# Patient Record
Sex: FEMALE | Race: WHITE | NOT HISPANIC OR LATINO | Employment: OTHER | URBAN - METROPOLITAN AREA
[De-identification: names, ages, dates, MRNs, and addresses within clinical notes are randomized per-mention and may not be internally consistent; named-entity substitution may affect disease eponyms.]

---

## 2023-07-17 ENCOUNTER — HOSPITAL ENCOUNTER (OUTPATIENT)
Dept: DATA CONVERSION | Facility: HOSPITAL | Age: 73
End: 2023-07-18
Attending: NEUROLOGICAL SURGERY | Admitting: NEUROLOGICAL SURGERY
Payer: MEDICARE

## 2023-07-17 DIAGNOSIS — M48.062 SPINAL STENOSIS, LUMBAR REGION WITH NEUROGENIC CLAUDICATION: ICD-10-CM

## 2023-07-17 LAB
POCT GLUCOSE: 127 MG/DL (ref 74–99)
POCT GLUCOSE: 146 MG/DL (ref 74–99)
POCT GLUCOSE: 147 MG/DL (ref 74–99)
POCT GLUCOSE: 150 MG/DL (ref 74–99)
POCT GLUCOSE: 152 MG/DL (ref 74–99)

## 2023-07-18 LAB
ANION GAP IN SER/PLAS: 11 MMOL/L (ref 10–20)
CALCIUM (MG/DL) IN SER/PLAS: 8.6 MG/DL (ref 8.6–10.3)
CARBON DIOXIDE, TOTAL (MMOL/L) IN SER/PLAS: 26 MMOL/L (ref 21–32)
CHLORIDE (MMOL/L) IN SER/PLAS: 106 MMOL/L (ref 98–107)
CREATININE (MG/DL) IN SER/PLAS: 1.29 MG/DL (ref 0.5–1.05)
ERYTHROCYTE DISTRIBUTION WIDTH (RATIO) BY AUTOMATED COUNT: 13.2 % (ref 11.5–14.5)
ERYTHROCYTE DISTRIBUTION WIDTH (RATIO) BY AUTOMATED COUNT: NORMAL
ERYTHROCYTE MEAN CORPUSCULAR HEMOGLOBIN CONCENTRATION (G/DL) BY AUTOMATED: 30.4 G/DL (ref 32–36)
ERYTHROCYTE MEAN CORPUSCULAR HEMOGLOBIN CONCENTRATION (G/DL) BY AUTOMATED: NORMAL
ERYTHROCYTE MEAN CORPUSCULAR VOLUME (FL) BY AUTOMATED COUNT: 94 FL (ref 80–100)
ERYTHROCYTE MEAN CORPUSCULAR VOLUME (FL) BY AUTOMATED COUNT: NORMAL
ERYTHROCYTES (10*6/UL) IN BLOOD BY AUTOMATED COUNT: 3.73 X10E12/L (ref 4–5.2)
ERYTHROCYTES (10*6/UL) IN BLOOD BY AUTOMATED COUNT: NORMAL
GFR FEMALE: 43 ML/MIN/1.73M2
GLUCOSE (MG/DL) IN SER/PLAS: 112 MG/DL (ref 74–99)
HEMATOCRIT (%) IN BLOOD BY AUTOMATED COUNT: 35.2 % (ref 36–46)
HEMATOCRIT (%) IN BLOOD BY AUTOMATED COUNT: NORMAL
HEMOGLOBIN (G/DL) IN BLOOD: 10.7 G/DL (ref 12–16)
HEMOGLOBIN (G/DL) IN BLOOD: NORMAL
LEUKOCYTES (10*3/UL) IN BLOOD BY AUTOMATED COUNT: 10.9 X10E9/L (ref 4.4–11.3)
LEUKOCYTES (10*3/UL) IN BLOOD BY AUTOMATED COUNT: NORMAL
NRBC (PER 100 WBCS) BY AUTOMATED COUNT: 0 /100 WBC (ref 0–0)
NRBC (PER 100 WBCS) BY AUTOMATED COUNT: NORMAL
PLATELETS (10*3/UL) IN BLOOD AUTOMATED COUNT: 220 X10E9/L (ref 150–450)
PLATELETS (10*3/UL) IN BLOOD AUTOMATED COUNT: NORMAL
POCT GLUCOSE: 106 MG/DL (ref 74–99)
POCT GLUCOSE: 132 MG/DL (ref 74–99)
POTASSIUM (MMOL/L) IN SER/PLAS: 4.2 MMOL/L (ref 3.5–5.3)
SODIUM (MMOL/L) IN SER/PLAS: 139 MMOL/L (ref 136–145)
UREA NITROGEN (MG/DL) IN SER/PLAS: 19 MG/DL (ref 6–23)

## 2023-07-19 LAB
ANION GAP IN SER/PLAS: NORMAL
CALCIUM (MG/DL) IN SER/PLAS: NORMAL
CARBON DIOXIDE, TOTAL (MMOL/L) IN SER/PLAS: NORMAL
CHLORIDE (MMOL/L) IN SER/PLAS: NORMAL
CREATININE (MG/DL) IN SER/PLAS: NORMAL
ERYTHROCYTE DISTRIBUTION WIDTH (RATIO) BY AUTOMATED COUNT: NORMAL
ERYTHROCYTE MEAN CORPUSCULAR HEMOGLOBIN CONCENTRATION (G/DL) BY AUTOMATED: NORMAL
ERYTHROCYTE MEAN CORPUSCULAR VOLUME (FL) BY AUTOMATED COUNT: NORMAL
ERYTHROCYTES (10*6/UL) IN BLOOD BY AUTOMATED COUNT: NORMAL
GFR FEMALE: NORMAL
GFR MALE: NORMAL
GLUCOSE (MG/DL) IN SER/PLAS: NORMAL
HEMATOCRIT (%) IN BLOOD BY AUTOMATED COUNT: NORMAL
HEMOGLOBIN (G/DL) IN BLOOD: NORMAL
LEUKOCYTES (10*3/UL) IN BLOOD BY AUTOMATED COUNT: NORMAL
NRBC (PER 100 WBCS) BY AUTOMATED COUNT: NORMAL
PLATELETS (10*3/UL) IN BLOOD AUTOMATED COUNT: NORMAL
POTASSIUM (MMOL/L) IN SER/PLAS: NORMAL
SODIUM (MMOL/L) IN SER/PLAS: NORMAL
UREA NITROGEN (MG/DL) IN SER/PLAS: NORMAL

## 2023-09-20 PROBLEM — M25.78 DEGENERATION OF INTERVERTEBRAL DISC OF CERVICAL REGION WITH OSTEOPHYTE OF CERVICAL VERTEBRA: Status: ACTIVE | Noted: 2023-09-20

## 2023-09-20 PROBLEM — Z98.1 HISTORY OF LUMBAR SPINAL FUSION: Status: ACTIVE | Noted: 2023-09-20

## 2023-09-20 PROBLEM — L03.032 PARONYCHIA OF GREAT TOE OF LEFT FOOT: Status: ACTIVE | Noted: 2023-09-20

## 2023-09-20 PROBLEM — M48.062 NEUROGENIC CLAUDICATION DUE TO LUMBAR SPINAL STENOSIS: Status: ACTIVE | Noted: 2023-09-20

## 2023-09-20 PROBLEM — M43.10 SPONDYLOLISTHESIS, GRADE 1: Status: ACTIVE | Noted: 2023-09-20

## 2023-09-20 PROBLEM — K58.0 IRRITABLE BOWEL SYNDROME WITH DIARRHEA: Status: ACTIVE | Noted: 2023-09-20

## 2023-09-20 PROBLEM — L60.0 ONYCHOCRYPTOSIS: Status: ACTIVE | Noted: 2023-09-20

## 2023-09-20 PROBLEM — R26.89 IMBALANCE: Status: ACTIVE | Noted: 2023-09-20

## 2023-09-20 PROBLEM — G95.9 CERVICAL MYELOPATHY (MULTI): Status: ACTIVE | Noted: 2023-09-20

## 2023-09-20 PROBLEM — R20.0 NUMBNESS OF ANTERIOR THIGH: Status: ACTIVE | Noted: 2023-09-20

## 2023-09-20 PROBLEM — M50.30 DEGENERATION OF INTERVERTEBRAL DISC OF CERVICAL REGION WITH OSTEOPHYTE OF CERVICAL VERTEBRA: Status: ACTIVE | Noted: 2023-09-20

## 2023-09-20 PROBLEM — G56.00 CARPAL TUNNEL SYNDROME: Status: ACTIVE | Noted: 2023-09-20

## 2023-09-20 PROBLEM — B35.1 ONYCHOMYCOSIS: Status: ACTIVE | Noted: 2023-09-20

## 2023-09-20 PROBLEM — M47.22 CERVICAL SPONDYLOSIS WITH RADICULOPATHY: Status: ACTIVE | Noted: 2023-09-20

## 2023-09-20 PROBLEM — L89.892: Status: ACTIVE | Noted: 2023-09-20

## 2023-09-20 RX ORDER — METFORMIN HYDROCHLORIDE 1000 MG/1
TABLET ORAL
COMMUNITY
Start: 2023-07-16

## 2023-09-20 RX ORDER — DOXAZOSIN 4 MG/1
1 TABLET ORAL DAILY
COMMUNITY
Start: 2013-12-27

## 2023-09-20 RX ORDER — ACETAMINOPHEN 500 MG
1 TABLET ORAL NIGHTLY
COMMUNITY

## 2023-09-20 RX ORDER — CHOLESTYRAMINE 4 G/9G
2 POWDER, FOR SUSPENSION ORAL DAILY
COMMUNITY
Start: 2017-01-12

## 2023-09-20 RX ORDER — ATORVASTATIN CALCIUM 20 MG/1
1 TABLET, FILM COATED ORAL DAILY
COMMUNITY
Start: 2014-01-02

## 2023-09-20 RX ORDER — TRIAMTERENE/HYDROCHLOROTHIAZID 37.5-25 MG
1 TABLET ORAL DAILY
COMMUNITY
Start: 2014-09-24

## 2023-09-20 RX ORDER — MULTIVIT-MIN/FA/LYCOPEN/LUTEIN .4-300-25
1 TABLET ORAL DAILY
COMMUNITY

## 2023-09-20 RX ORDER — CICLOPIROX 80 MG/ML
SOLUTION TOPICAL
COMMUNITY
Start: 2019-04-18

## 2023-09-20 RX ORDER — LORAZEPAM 0.5 MG/1
2 TABLET ORAL 2 TIMES DAILY PRN
COMMUNITY

## 2023-09-20 RX ORDER — CLOBETASOL PROPIONATE 0.5 MG/G
CREAM TOPICAL
COMMUNITY
Start: 2014-02-03

## 2023-09-20 RX ORDER — GENTAMICIN SULFATE 1 MG/G
CREAM TOPICAL
COMMUNITY
Start: 2019-12-02

## 2023-09-20 RX ORDER — METFORMIN HYDROCHLORIDE 500 MG/1
1 TABLET ORAL
COMMUNITY
Start: 2014-09-24

## 2023-09-20 RX ORDER — METOPROLOL SUCCINATE 50 MG/1
1 TABLET, EXTENDED RELEASE ORAL DAILY
COMMUNITY
Start: 2023-07-16

## 2023-09-20 RX ORDER — DIAZEPAM 5 MG/1
TABLET ORAL
COMMUNITY
Start: 2023-04-03

## 2023-09-20 RX ORDER — SERTRALINE HYDROCHLORIDE 25 MG/1
25 TABLET, FILM COATED ORAL DAILY
COMMUNITY

## 2023-09-20 RX ORDER — CYCLOBENZAPRINE HCL 5 MG
1-2 TABLET ORAL 3 TIMES DAILY PRN
COMMUNITY

## 2023-09-20 RX ORDER — SERTRALINE HYDROCHLORIDE 50 MG/1
50 TABLET, FILM COATED ORAL DAILY
COMMUNITY
Start: 2023-07-16

## 2023-09-20 RX ORDER — NEBIVOLOL 20 MG/1
TABLET ORAL
COMMUNITY
Start: 2013-12-31

## 2023-09-29 VITALS — BODY MASS INDEX: 34.96 KG/M2 | WEIGHT: 185.19 LBS | HEIGHT: 61 IN

## 2023-09-30 NOTE — PROGRESS NOTES
Service: General Internal Medicine     Subjective Data:   SIENNA MALCOLM is a 73 year old Female who is Hospital Day # 2 and POD #1 for 1. L2-3 L3-4 DECOMPRESSIVE LAMINECTOMY WITH FLAT PLATE LUMBAR X-RAY;    Additional Information:    A complete 10 point review of systems was completed and is otherwise negative unless stated.     Objective Data:     Objective Information:      T   P  R  BP   MAP  SpO2   Value  36.8  75  18  122/59   84  95%  Date/Time 7/18 7:27 7/18 7:27 7/18 7:27 7/18 7:27 7/18 7:27 7/18 7:27  Range  (36.2C - 37.2C )  (63 - 75 )  (16 - 18 )  (112 - 131 )/ (56 - 73 )  (79 - 92 )  (93% - 98% )   As of 17-Jul-2023 12:37:00, patient is on 2 L/min of oxygen via room air.  Highest temp of 37.2 C was recorded at 7/17 23:22      Pain reported at 7/18 8:00: 3 = Mild    Physical Exam Narrative:  ·  Physical Exam:    Physical Exam:   General appearance: no distress, well-appearing   HEENT: Atraumatic/normocephalic, moist mucosa  Neck: no obvious deformity, JVP WNL  Respiratory: good air movement, appropriate respiratory effort, no wheezing or crackles  Cardiovascular: regular rate, regular rhythm, no peripheral edema  Abdomen: no organomegaly, no tenderness to palpation in all quadrants  Extremities: strong peripheral pulses, no grossly obvious deformities   Skin: intact, no rashes   Neurologic: Alert and oriented x 3, No obvious focal deficit  Psych: appropriate mood & affect, cooperative     Recent Lab Results:    Results:    CBC: 7/18/2023 05:48              \     Hgb     /                              \     10.7 L    /  WBC  ----------------  Plt               10.9       ----------------    220              /     Hct     \                              /     35.2 L    \            RBC: 3.73 L    MCV: 94           BMP: 7/18/2023 05:48  NA+        Cl-     BUN  /                         139    106    19  /  --------------------------------  Glucose                ---------------------------  112 H     K+     HCO3-   Creat \                         4.2  26    1.29 H \  Calcium : 8.6     Anion Gap : 11      Assessment and Plan:   Daily Risk Screen:  ·  Does patient have an indwelling urinary catheter? n/a consulting service     Code Status:  ·  Code Status Full Code     Assessment:    73-year-old here for elective lumbar laminectomy.    7/18: Pain is tolerable at present, worked with PT last night, no medical issues or lab derangements    #Postop day 1, lumbar laminectomy  #History of lumbar and cervical spinal stenosis  Management per neurosurgical team  Analgesia per neurosurgical team  Educated on postop alarm signs and when to contact medical team  Given family history, wonder if she has some HLA gene mutation    #Hypertension  #Type 2 diabetes  #Anxiety and depression  Continue home meds  Watch sugars, sliding scale if sugars become out of control    Diet:reg  Fluids:prn  DVT ppx: enox  Dispo:pending  Code Status: full    This is a preliminary note, please await attending attestation for final assessment and plan.    Mhaad Fleming, DO PGY-3. Please contact me on [facility] with any additional questions, comments, or concerns.    This note was entered with the assistance of voice recognition software. Please excuse any grammatical or syntactic errors, I attempted to correct them to the best of my ability.      Attestation:   Note Completion:  I am a:  Resident/Fellow   Attending Attestation I saw and evaluated the patient.  I personally obtained the key and critical portions of the history and physical exam or was physically present for key and  critical portions performed by the resident/fellow. I reviewed the resident/fellow?s documentation and discussed the patient with the resident/fellow.  I agree with the resident/fellow?s medical decision making as documented in the note.     I personally evaluated the patient on 18-Jul-2023   Comments/ Additional Findings    I interviewed and examined patient with  resident team.          Electronic Signatures:  Mahad Fleming (DO (Resident))  (Signed 18-Jul-2023 11:33)   Authored: Service, Subjective Data, Objective Data, Assessment  and Plan, Note Completion  Clarence Lynn)  (Signed 19-Jul-2023 19:08)   Authored: Note Completion   Co-Signer: Service, Subjective Data, Objective Data, Assessment and Plan, Note Completion      Last Updated: 19-Jul-2023 19:08 by Clarence Lynn)

## 2023-09-30 NOTE — PROGRESS NOTES
Service: Neurosurgery     Subjective Data:   SIENNA MALCOLM is a 73 year old Female who is Hospital Day # 2 and POD #1 for 1. L2-3 L3-4 DECOMPRESSIVE LAMINECTOMY WITH FLAT PLATE LUMBAR X-RAY;    Additional Information:    Patient seen and examined with Dr. Terry Moran:  Rodriguez removed this morning  She reports improvement in lower extremities post operatively  Lumbar incision is well-approximated, no swelling / erythema / drainage    POD#1 L2 - 4 decompressive laminectomy by Dr. Moran  - progressing well  - await PT / OT evaluation prior to discharge  - discharge home today  Abigail Barnes CNP    Objective Data:     Objective Information:      T   P  R  BP   MAP  SpO2   Value  36.8  75  16  122/59   84  93%  Date/Time 7/18 7:27 7/18 7:27 7/18 3:22 7/18 7:27  7/18 7:27 7/18 7:27  Range  (36.2C - 37.2C )  (63 - 75 )  (16 - 18 )  (112 - 131 )/ (56 - 73 )  (79 - 92 )  (93% - 98% )   As of 17-Jul-2023 12:37:00, patient is on 2 L/min of oxygen via room air.  Highest temp of 37.2 C was recorded at 7/17 23:22      Pain reported at 7/18 7:38: 4 = Moderate    Recent Lab Results:    Results:    CBC: 7/18/2023 05:48              \     Hgb     /                              \     10.7 L    /  WBC  ----------------  Plt               10.9       ----------------    220              /     Hct     \                              /     35.2 L    \            RBC: 3.73 L    MCV: 94           BMP: 7/18/2023 05:48  NA+        Cl-     BUN  /                         139    106    19  /  --------------------------------  Glucose                ---------------------------  112 H    K+     HCO3-   Creat \                         4.2  26    1.29 H \  Calcium : 8.6     Anion Gap : 11      Assessment and Plan:   Daily Risk Screen:  ·  Does patient have an indwelling urinary catheter? yes   ·  Plan for indwelling urinary catheter removal today? yes     Comorbidities:  ·  Comorbidity obesity   ·  Obesity obesity (BMI 35-39.9)   ·   BMI 35.56     Code Status:  ·  Code Status Full Code     Advance Care Planning:  Advance Care Planning: Patient didn't wish to or  was unable to provide advance care plan       Electronic Signatures:  Abigail Barnes (APRN-CNP)  (Signed 18-Jul-2023 07:58)   Authored: Service, Subjective Data, Objective Data, Assessment  and Plan, Note Completion      Last Updated: 18-Jul-2023 07:58 by Abigail Barnes (APRN-CNP)

## 2023-09-30 NOTE — H&P
History & Physical Reviewed:   I have reviewed the History and Physical dated:  30-Jun-2023   History and Physical reviewed and relevant findings noted. Patient examined to review pertinent physical  findings.: No significant changes  pt examined by Dr Moran   Home Medications Reviewed: no changes noted   Allergies Reviewed: no changes noted       ERAS (Enhanced Recovery After Surgery):  ·  ERAS Patient: yes   ·  CPM/PAT Utilization: yes   ·  Immunonutrition Recovery Drink Utilization: no   ·  Carbohydrate Supplement Drink Utilization: no     Consent:   COVID-19 Consent:  ·  COVID-19 Risk Consent Surgeon has reviewed key risks related to the risk of roshan COVID-19 and if they contract COVID-19 what the risks are.     Attestation:   Note Completion:  I am a:  Advanced Practice Provider   Attending Only - Shared Visit with Advanced Practice Provider This is a shared visit.  I have reviewed the Advanced Practice Provider?s encounter note, approve the Advanced Practice Provider?s documentation,  and provide the following additional information from my personal encounter.    Comments/ Additional Findings    I personally saw this patient and I discussed the findings and the plan with the advanced practitioner.  I have personally reviewed all the available  imaging studies.  I agree with the assessment and the plan as stated.    Terry Moran MD          Electronic Signatures:  Terry Moran)  (Signed 17-Jul-2023 10:21)   Authored: Note Completion   Co-Signer: History & Physical Reviewed, ERAS, Consent, Note Completion  Heidi Ruelas (APRN-CNS)  (Signed 17-Jul-2023 08:01)   Authored: History & Physical Reviewed, ERAS, Consent,  Note Completion      Last Updated: 17-Jul-2023 10:21 by Terry Moran)

## 2023-09-30 NOTE — DISCHARGE SUMMARY
Send Summary:   Discharge Summary Providers:  Provider Role Provider Name   · Attending Terry Moran       Note Recipients: Terry Moran MD Debs, Michael, MD       Discharge:    Summary:   Admission Date: .17-Jul-2023 06:18:00   Discharge Date: 18-Jul-2023   Attending Physician at Discharge: Abigail Barnes   Admission Reason: Neurogenic claudication, lumbar  radiculopathy   Final Discharge Diagnoses: s/p decompression of lumbar  spinal canal   Procedures: Date: 17-Jul-2023 10:21:00  Procedure Name: 1. L2-3 L3-4 DECOMPRESSIVE LAMINECTOMY WITH FLAT PLATE LUMBAR X-RAY   Condition at Discharge: Satisfactory   Disposition at Discharge: .Home   Vital Signs:        T   P  R  BP   MAP  SpO2   Value  37.3  77  18  103/61   68  93%  Date/Time 7/18 11:40 7/18 11:40 7/18 11:40 7/18 11:40  7/18 11:40 7/18 11:40  Range  (36.2C - 37.3C )  (63 - 77 )  (16 - 18 )  (103 - 131 )/ (56 - 73 )  (68 - 92 )  (93% - 98% )   As of 17-Jul-2023 12:37:00, patient is on 2 L/min of oxygen via room air.  Highest temp of 37.3 C was recorded at 7/18 11:40    Date:            Weight/Scale Type:  Height:   17-Jul-2023 15:01  84  kg         153.7  cm  Physical Exam:    A&O x 3, speech clear  HUDSON  Lumbar incision C/D/I  Tolerating oral nutrition well  Hospital Course:    Admitted for lumbar decompression  Underwent L2 - 4 decompression on 07/17/2023  Post op course uncomplicated  Worked with PT / OT who felt she was safe for home discharge      Discharge Information:    and Continuing Care:   Lab Results - Pending:    None  Radiology Results - Pending: None   Discharge Instructions:    Activity:           activity as tolerated.          May shower..  do NOT RUB/SCRUB incision. DAB/PAT dry          May not drive until follow-up visit.            No pushing, pulling, or lifting objects greater than 10 pounds.            Walk hourly while awake  Wear lumbar brace when out of bed    Nutrition/Diet:           resume normal diet           Encourage Fluids:   Maintain hydration    Wound Care:           Wound Site:   Lumbar spine          Wound Type:   surgical incision          Cover With:   no dressing, leave open to air          Instructions:   no lotions, creams, or tub soaks          Other Instructions:   DO NOT TOUCH INCISION    Discharge Medications: Home Medication   docusate sodium 100 mg oral capsule - 1 cap(s) orally 2 times a day  polyethylene glycol 3350 oral powder for reconstitution - 17 gram(s) orally 2 times a day  metoprolol 50 mg oral tablet - 1 tab oral once a day  doxazosin 4 mg oral tablet - 1 tab oral once a day  atorvastatin 20 mg oral tablet - 1 tab oral once a day  Triamterene - 1 tab oral once a day  sertraline 50 mg oral tablet - 1 tab oral once a day  metFORMIN 500 mg oral tablet - 1 tab oral twice a day  cholestyramine 4 g/9 g oral powder for reconstitution - 1 tab oral once a day  acetaminophen 325 mg oral tablet - 3 tab(s) orally every 8 hours     PRN Medication   oxyCODONE 5 mg oral tablet - 1 tab(s) orally every 4 hours, As needed, Pain - Mod (4-6)     DNR Status:   ·  Code Status Code Status order at time of discharge: Full Code       Electronic Signatures:  Abigail Barnes ([facility]-CNP)  (Signed 18-Jul-2023 14:47)   Authored: Send Summary, Summary Content, Ongoing Care,  DNR Status, Note Completion      Last Updated: 18-Jul-2023 14:47 by Abigail Barnes (APRN-CNP)

## 2023-10-02 NOTE — OP NOTE
PROCEDURE DETAILS    Preoperative Diagnosis:  Neurogenic claudication due to lumbar spinal stenosis, M48.062    Postoperative Diagnosis:  Neurogenic claudication due to lumbar spinal stenosis, M48.062    Surgeon: Terry Moran  Resident/Fellow/Other Assistant: Darcie Rodriguez    Procedure:  1. L2-3 L3-4 DECOMPRESSIVE LAMINECTOMY WITH FLAT PLATE LUMBAR X-RAY    Anesthesia: No anesthesiologist associated with this case  Estimated Blood Loss: 20cc  Findings: Severe lateral recess stenosis bilaterally at L3-4 and L2-3.  Minimal blood loss and no durotomies.  Patient was stable throughout.  Specimens(s) Collected: no,     Complications: None  Drains and/or Catheters: Rodriguez catheter  Additional Details: The patient was brought into the operating room.  A timeout huddle was performed.  Baseline evoked potential monitoring was begun.  General endotracheal tube anesthesia  was administered and a glide scope intubation was performed.  Once we had baselines then the patient was positioned prone on the Ramon frame.  Her back was prepped and draped in usual sterile fashion.  The midline incision was drawn out.  An x-ray was  taken to verify proper location for our skin incision.  Then the skin was incised with a #10 blade.  Hemostasis was obtained using the Bovie electrocautery.  The Bovie was used to incise down through the subcutaneous fat down to the fascia.  The fascia  was incised off the spinous process of L2-L3 and L4.  Another confirmatory x-ray was taken.  The spinous process of L2 and L3 were removed as was a very small portion of the top of L4.  Then the lamina were thinned with a Leksell rongeur.  Then I was  able to use a Kerrison punch to remove much of the lamina I did use the chisel to undercut the inferior articulating process of L3 but I did not requiring that at L2.  I also chiseled off the top ring of L4 and then I was able to get underneath the kissing  facets at the L3-4 level.  This was the  tightest of the 2 levels.  There was also a small synovial cyst on the patient's left side.  I did fulgurate the surface of the facet joint to help avoid future synovial cyst.  Then I moved up 1 side and down the  other with the Kerrison punches and use the foraminotomies opening curved Kerrisons as well.  Once I was satisfied that the canal was decompressed and all of the foramina were widely open I irrigated with copious amounts of irrigation.  I took 1 final  confirmatory x-ray.  We had released the retractor system and there was no significant bleeding so I did not leave a drain.  The muscle was approximated with a couple of interrupted 0 Vicryl sutures.  The fascia was closed in a watertight manner with  interrupted 0 Vicryl sutures then I used a strata fix running stitch of 0 PDS to make certain that we had a watertight fascial closing.  Then we used inverted interrupted 2-0 Vicryl in the subcutaneous tissue.  We used 4-0 Vicryl on the subcu reticular  running stitch and reinforced the skin with glue.  The patient was stable throughout.  Terry Moran MD                                Attestation:   Note Completion:  Attending Attestation I performed the procedure without a resident         Electronic Signatures:  Terry Moran)  (Signed 17-Jul-2023 10:28)   Authored: Post-Operative Note, Chart Review, Note Completion      Last Updated: 17-Jul-2023 10:28 by Terry Moran)

## 2023-10-03 ENCOUNTER — TREATMENT (OUTPATIENT)
Dept: PHYSICAL THERAPY | Facility: CLINIC | Age: 73
End: 2023-10-03
Payer: MEDICARE

## 2023-10-03 ENCOUNTER — ANCILLARY PROCEDURE (OUTPATIENT)
Dept: RADIOLOGY | Facility: HOSPITAL | Age: 73
End: 2023-10-03
Payer: MEDICARE

## 2023-10-03 DIAGNOSIS — M48.062 SPINAL STENOSIS, LUMBAR REGION WITH NEUROGENIC CLAUDICATION: ICD-10-CM

## 2023-10-03 DIAGNOSIS — M48.062 NEUROGENIC CLAUDICATION DUE TO LUMBAR SPINAL STENOSIS: Primary | ICD-10-CM

## 2023-10-03 PROCEDURE — 97113 AQUATIC THERAPY/EXERCISES: CPT | Mod: GP,CQ

## 2023-10-03 PROCEDURE — 72110 X-RAY EXAM L-2 SPINE 4/>VWS: CPT | Performed by: RADIOLOGY

## 2023-10-03 PROCEDURE — 72110 X-RAY EXAM L-2 SPINE 4/>VWS: CPT

## 2023-10-03 NOTE — PROGRESS NOTES
Physical Therapy    Physical Therapy Treatment    Patient Name: Angelina Becerril  MRN: [id number]  Today's Date: 10/3/2023  Time Calculation  Start Time: 0318  Stop Time: 0405  Time Calculation (min): 47 min      Assessment:   Patient required verbal cueing for BLE control against buoyancy when performing hip AROM. Ambulated with increased jeff while unloaded in 4 feet water depth. Improved postural awareness with verbal cueing with DLSstrengthening at wall.     Plan:   Progress with  Chi and yoga NV per tolerance.   Continue with poc as documented in the legacy system.        Current Problem  1. Neurogenic claudication due to lumbar spinal stenosis        2. Spinal stenosis, lumbar region with neurogenic claudication  PT eval and treat          Subjective   General   Patient had X-Ray today and follows up with Dr. Moran on October 20th. Was sore after her last session, but it felt good. Reports she received the flu vaccine today and feels ok from it.     Insurance reviewed   Visit number: 6/12   Approved number of visits: 12   Authorization date range: 9/14/23-12/2/23   Authorization required after evaluation   Onset Date: 07/ 17/ 2023     Precautions   Per MD Note: Start water-based physical therapy now. She will continue to wear the brace. She can carry up to 20 to 25 pounds now. No bending or twisting except when she gets into the water  PMH: Anxiety, DM, Cholecystomy, s/p ankle surgery; s/p back surgery.        Pain   0/10 no pain    Objective    Ambulates on pool deck with reciprocal arm swing and good jeff w/ reciprocal step sequence and decreased BLE heelstrike and toe off  Ascends/Descends stairs w/ reciprocal gait w/ ambar handrail support w/ [place]   Posture     Forward head, rounded shoulders  Treatments:    Aquatic 45 minutes  *Floater**  **Planning for [facility] for AQUA HEP**  C=Current, BTW = Back to Wall, NV = Next Visit, NP = Not Performed, G = Group, NB = non-billable     Forward  "ambulation, TM T=6 C=10 5 minutes  Retro Ambulation, TM T=3 C=10 4 minutes  Lateral ambulation w/ BUE open paddle ABD/ADD 6 laps each    H4 Way R/TR with barbell x 15 C=8 Facing current  NV  C=10 4 way march w/ barbell x 10 each  C=10 3 way hip AROM with TM bar support x 15 each ambar against current  C=10 Partial Squats in TM bars facing C x 10    BTW abdominal draw in w/ diaphragmatic breathing 5\"/10  BTW Can Cans 2x15   BTW DLS BUE closed paddles flex/ext, ABD/ADD symmetrical/reciprocal  x 10 each  BTW DLS open board push/pull, up/down x 10 each  WBOS Noodle Press Blue BL x15   Deep End w/ noodle:  Cycle 2 minutes  Scissors, Cross [place] 1 minute each  Hang 3 minutes    Stair Stretches: BLE hamstrings/hip flexors/calves 3x30s EACH       OP EDUCATION:     Trunk stabilization, BLE control against  buoyancy, AQUA HEP planning at [place] [facility]     "

## 2023-10-05 ENCOUNTER — TREATMENT (OUTPATIENT)
Dept: PHYSICAL THERAPY | Facility: CLINIC | Age: 73
End: 2023-10-05
Payer: MEDICARE

## 2023-10-05 DIAGNOSIS — M48.062 SPINAL STENOSIS, LUMBAR REGION WITH NEUROGENIC CLAUDICATION: ICD-10-CM

## 2023-10-05 PROCEDURE — 97113 AQUATIC THERAPY/EXERCISES: CPT | Mod: GP,CQ

## 2023-10-05 NOTE — PROGRESS NOTES
Physical Therapy    Physical Therapy Treatment    Patient Name: Angelina Becerril  MRN: [id number]  Today's Date: 10/5/2023  Time Calculation  Start Time: 1000  Stop Time: 1049  Time Calculation (min): 49 min      Assessment:   Patient performed DLS progression of added paddle resistance to multi-directional marching with verbal cueing for coordination, TA activation and for postural awareness. Challenged with dynamic balance when performing multi-directional HR/TR against current w/ LOB x 1 and steppage independently for balance recovery.     Plan:  Monitor response to aquatic exercise progression of decreased BUE support and added drag resistance with multi-directional strengthening. Monitor response to progression of yoga and  Chi NV.  Continue with poc as documented in the [employer] system.        Current Problem  1. Spinal stenosis, lumbar region with neurogenic claudication  PT eval and treat          Subjective   General   Patient reports she felt good after last session; a bit sore, but muscles felt like they were worked. Feels good today, with no pain. Woke up feeling stiffness at her anterior quads, but they feel good now that she has been moving this morning. Reports difficulty with getting up from the floor.     Insurance reviewed   Visit number: 7/12   Approved number of visits: 12   Authorization date range: 9/14/23-12/2/23   Authorization required after evaluation   Onset Date: 07/ 17/ 2023       Precautions    Per MD Note: Start water-based physical therapy now. She will continue to wear the brace. She can carry up to 20 to 25 pounds now. No bending or twisting except when she gets into the water  PMH: Anxiety, DM, Cholecystomy, s/p ankle surgery; s/p back surgery.      Pain   0/10    Objective   Ambulates on pool deck with reciprocal arm swing and good jeff w/ reciprocal step sequence and decreased BLE heelstrike and toe off  Ascends/Descends stairs w/ reciprocal gait w/ ambar handrail support w/  "[place]      Posture   Forward head, rounded shoulders    Treatments:  Aquatic 49 minutes  *Floater**  **Planning for [facility] for AQUA HEP**  C=Current, BTW = Back to Wall, NV = Next Visit, NP = Not Performed, G = Group, NB = non-billable      Forward ambulation, TM T=6 C=10 5 minutes  Retro Ambulation, TM T=3 C=10 4 minutes  Lateral ambulation w/ BUE open paddle ABD/ADD 6 laps each     C=10 4 Way R/TR with barbell x 10 each  C=10 4 way march w/ open paddle punches x 10 each  C=10 3 way hip AROM with  barbell and wall support x 15 each ambar against current  C=10 Partial Squats in TM bars facing C x 10     BTW abdominal draw in w/ diaphragmatic breathing 5\"/10  BTW Can Cans 2x15   BTW DLS BUE closed paddles flex/ext, ABD/ADD symmetrical/reciprocal  x 10 each  BTW DLS open board push/pull, up/down x 10 each  WBOS Noodle Press Blue BL x15   Deep End w/ noodle:  Cycle 2 minutes  Scissors, Cross [place] 1 minute each  Hang 3 minutes     Stair Stretches: BLE hamstrings/hip flexors/calves 3x30s EACH          OP EDUCATION:   Coordination w/ paddles, TA activation, postural awareness,  Chi and yoga progression       "

## 2023-10-10 ENCOUNTER — TREATMENT (OUTPATIENT)
Dept: PHYSICAL THERAPY | Facility: CLINIC | Age: 73
End: 2023-10-10
Payer: MEDICARE

## 2023-10-10 DIAGNOSIS — M48.062 SPINAL STENOSIS, LUMBAR REGION WITH NEUROGENIC CLAUDICATION: ICD-10-CM

## 2023-10-10 PROCEDURE — 97113 AQUATIC THERAPY/EXERCISES: CPT | Mod: GP,CQ

## 2023-10-10 NOTE — PROGRESS NOTES
Physical Therapy    Physical Therapy Treatment    Patient Name: Angelina Becerril  MRN: [id number]  Today's Date: 10/10/2023  Time Calculation  Start Time: 1015  Stop Time: 1100  Time Calculation (min): 45 min      Assessment:   Positive response to aquatic exercise program performed in open community pool setting. Patient continues to require verbal cueing for BLE control against buoyancy. Displayed improved trunk stabilization and maintained TA activation with decreased cueing required. Required verbal cueing for bilateral quad stabilization and for postural awareness with step ups and step downs.     Plan:  Monitor response to aquatic exercise progression in open community pool setting NV. Progress yoga per tolerance NV.   Continue with poc as documented in the [employer] system.        Current Problem  1. Spinal stenosis, lumbar region with neurogenic claudication  PT eval and treat          Subjective   General   Patient reports she walked her dog for the first time for about 20 minutes and lower back pain increased. Feels better this morning., with no pain. Reports continued difficulty going up and down stairs when there is not a handrail and with her daughter's larger platform step into the house.,     Insurance reviewed   Visit number: 8/12   Approved number of visits: 12   Authorization date range: 9/14/23-12/2/23   Authorization required after evaluation   Onset Date: 07/ 17/ 2023       Precautions    Per MD Note: Start water-based physical therapy now. She will continue to wear the brace. She can carry up to 20 to 25 pounds now. No bending or twisting except when she gets into the water  PMH: Anxiety, DM, Cholecystomy, s/p ankle surgery; s/p back surgery.      Pain   0/10    Objective   Ambulates on pool deck with reciprocal arm swing and good jeff w/ reciprocal step sequence and decreased BLE heelstrike and toe off  Ascends/Descends stairs w/ reciprocal gait w/ ambar handrail support w/ [place]      Posture    "Forward head, rounded shoulders    Treatments:  Aquatic 45 minutes  **Treatment performed at [facility]**  *Floater**  **Planning for [facility] for AQUA HEP**  C=Current, BTW = Back to Wall, NV = Next Visit, NP = Not Performed, G = Group, NB = non-billable      Forward ambulation, TM T=6 C=10 5 minutes NV  Retro Ambulation, TM T=3 C=10 4 minutes NV  FWD/Retro Ambulation w/ BUE reciprocal arm swing 6 laps each  Lateral ambulation w/ BUE open paddle ABD/ADD 6 laps each     C=0 10 4 HR/TR with barbell x 10 each  C=0 march w/ open paddle punches x 10 each  C=0 3 way hip AROM with  barbell and wall support x 15 each ambar against current  C=0 Partial Squats in TM bars facing C x 10 NV     BTW abdominal draw in w/ diaphragmatic breathing 5\"/10  BTW Can Cans 2x15   BTW DLS BUE closed paddles flex/ext, ABD/ADD symmetrical/reciprocal  x 10 each  BTW DLS open board push/pull, up/down x 10 each  WBOS Noodle Press Blue BL x15     Deep End w/ noodle:  Cycle 2 minutes  Scissors, Cross [place] 1 minute each  Hang 3 minutes     Stair Stretches: BLE hamstrings/hip flexors/calves 3x30s EACH   Step Ups w/ palms together x 10 each lead LE  Step Downs w/ ambar HR support x 10 each lead LE         OP EDUCATION:TA activation and body mechanics/postural awareness when walking dog. Education provided on [facility] community aquatic class schedule.        "

## 2023-10-12 ENCOUNTER — TREATMENT (OUTPATIENT)
Dept: PHYSICAL THERAPY | Facility: CLINIC | Age: 73
End: 2023-10-12
Payer: MEDICARE

## 2023-10-12 DIAGNOSIS — M48.062 SPINAL STENOSIS, LUMBAR REGION WITH NEUROGENIC CLAUDICATION: ICD-10-CM

## 2023-10-12 PROCEDURE — 97113 AQUATIC THERAPY/EXERCISES: CPT | Mod: GP,CQ

## 2023-10-12 NOTE — PROGRESS NOTES
Physical Therapy    Physical Therapy Treatment    Patient Name: Angelina Becerril  MRN: [id number]  Today's Date: 10/12/2023  Time Calculation  Start Time: 1045  Stop Time: 1130  Time Calculation (min): 45 min      Assessment:   Patient requires decreased cueing  for trunk stabilization this visit. Improved BLE mobility with hip extension and flexion without pain. Required intermittent verbal cueing for BLE control against buoyancy in deep end.     Plan:  Patient will trial Aquatic HEP at [facility] pool prior to her re-assessment next week.    Continue with poc as documented in the legacy system.        Current Problem  1. Spinal stenosis, lumbar region with neurogenic claudication  PT eval and treat          Subjective   General   Patient reports getting a puppy was the best thing she has done in awhile and is walking more and feeling good. Has no pain.  Felt great after going to the [facility] pool for her last therapy treatment and signed up to start going to do her Aquatic HEP.   Insurance reviewed   Visit number: 9/12   Approved number of visits: 12   Authorization date range: 9/14/23-12/2/23   Authorization required after evaluation   Onset Date: 07/ 17/ 2023       Precautions    Per MD Note: Start water-based physical therapy now. She will continue to wear the brace. She can carry up to 20 to 25 pounds now. No bending or twisting except when she gets into the water  PMH: Anxiety, DM, Cholecystomy, s/p ankle surgery; s/p back surgery.      Pain   0/10    Objective   Ambulates on pool deck with reciprocal arm swing and good jeff w/ reciprocal step sequence and decreased BLE heelstrike and toe off  Ascends/Descends stairs w/ reciprocal gait w/ ambar handrail support w/ [place]      Posture   Forward head, rounded shoulders    Treatments:  Aquatic 45 minutes  *Floater**  **Planning for [facility] for AQUA HEP**  C=Current, BTW = Back to Wall, NV = Next Visit, NP = Not Performed, G = Group, NB = non-billable     "  Forward ambulation, TM T=6 C=10 5 minutes   Retro Ambulation, TM T=3 C=10 4 minutes   FWD/Retro Ambulation w/ BUE reciprocal arm swing 6 laps each  Lateral ambulation w/ BUE open paddle ABD/ADD 6 laps each     C=10 10 4 HR/TR with barbell x 10 each  C=10 march w/ open paddle punches x 10 each  C=10 3 way hip AROM with  barbell and wall support x 15 each ambar against current  C=10 Partial Squats w/ BUE open paddles flex/ext x 10 facing C  C=10 HR/TR w/ barbell support facing C x 10  C=10 HamCurls w/ barbell support facing away from C x 10  C=10 hip circumduction w/ barbell and wall support support CW/CCW x 10 each facing C     BTW abdominal draw in w/ diaphragmatic breathing 5\"/10  BTW Can Cans 2x15 NV  BTW DLS BUE closed paddles flex/ext, ABD/ADD symmetrical/reciprocal  x 10 each  BTW DLS closed  board push/pull, up/down x 10 each  WBOS Noodle Press Blue BL x15 NV    Deep End w/ noodle:  Cycle 3 minutes  Scissors, Cross Country 1 minute each  Hang 3 minutes     Stair Stretches: BLE hamstrings/hip flexors/calves 3x30s EACH   Step Ups w/ palms together x 10 each lead LE  Step Downs w/ ambar HR support x 10 each lead LE         OP EDUCATION: Aquatic HEP printouts and overview provided. Aquatic tool overview with email of links provided, BLE control against buoyancy       "

## 2023-10-17 ENCOUNTER — TREATMENT (OUTPATIENT)
Dept: PHYSICAL THERAPY | Facility: CLINIC | Age: 73
End: 2023-10-17
Payer: MEDICARE

## 2023-10-17 DIAGNOSIS — M48.062 SPINAL STENOSIS, LUMBAR REGION WITH NEUROGENIC CLAUDICATION: ICD-10-CM

## 2023-10-17 PROCEDURE — 97113 AQUATIC THERAPY/EXERCISES: CPT | Mod: GP,CQ

## 2023-10-17 NOTE — PROGRESS NOTES
Physical Therapy    Physical Therapy Treatment    Patient Name: Angelina Becerril  MRN: [id number]  Today's Date: 10/17/2023  Time Calculation  Start Time: 1055  Stop Time: 1150  Time Calculation (min): 55 min      Assessment:   Patient requires assist with hand placement for proper paddle placement in water to attain drag force. Improved TA activation throughout session, with decreased cueing required. Performed partial squats w/ BUE open paddle resistance with cueing, demonstration and assist required to decrease compensation and for maintained bilateral WB through heels.  Performed progression of multi-directional dynamic balance with decreased BUE support.   Plan:  Progress multi-directional HR/TR dynamic balance with eyes closed against current NV per tolerance NV. Add reciprocal paddle pulls to ambulation NV. Patient has one more visit and then re-assessment and will trial her Aquatic HEP at [facility] prior to her NV.   Continue with poc as documented in the [employer] system.        Current Problem  1. Spinal stenosis, lumbar region with neurogenic claudication  PT eval and treat          Subjective   General   Patient reports she walked her dog for 10 minutes this morning. Walked further yesterday, but today wet and cold. Has an appointment with her surgeon this Friday.   Insurance reviewed   Visit number: 10/12   Approved number of visits: 12   Authorization date range: 9/14/23-12/2/23   Authorization required after evaluation   Onset Date: 07/ 17/ 2023       Precautions    Per MD Note: Start water-based physical therapy now. She will continue to wear the brace. She can carry up to 20 to 25 pounds now. No bending or twisting except when she gets into the water  PMH: Anxiety, DM, Cholecystomy, s/p ankle surgery; s/p back surgery.      Pain   0/10    Objective   Ambulates on pool deck with reciprocal arm swing and good jeff w/ reciprocal step sequence and decreased BLE heelstrike and toe off  Ascends/Descends stairs w/  "reciprocal gait w/ ambar handrail support w/ [place]      Posture   Forward head, rounded shoulders    Treatments:  Aquatic 55 minutes  *Floater**  **Planning for [facility] for AQUA HEP**  C=Current, BTW = Back to Wall, NV = Next Visit, NP = Not Performed, G = Group, NB = non-billable      Forward ambulation, TM T=6 C=10 5 minutes   Retro Ambulation, TM T=3 C=10 4 minutes   FWD/Retro Ambulation w/ BUE reciprocal arm swing 6 laps each  Lateral ambulation w/ BUE open paddle ABD/ADD 6 laps each     C=10 10 4 HR/TR with barbell x 10 each  C=10 march w/ open paddle punches x 10 each  C=10 3 way hip AROM with  barbell and wall support x 15 each ambar against current  C=10 Partial Squats w/ BUE open paddles flex/ext x 10 facing C, x 10 away from C  C=10 4 Way HR/TR w/ palms together x 10 each  C=10 HamCurls w/ barbell support facing away from C x 10  C=10 hip circumduction w/ barbell and wall support support CW/CCW x 10 each facing C     BTW abdominal draw in w/ diaphragmatic breathing 5\"/10  BTW Can Cans 2x15 NV  BTW DLS BUE closed paddles flex/ext, ABD/ADD symmetrical/reciprocal  x 10 each  BTW DLS closed  board push/pull, up/down x 10 each  WBOS BUE Noodle Press Down x10    Deep End w/ noodle:  Cycle 3 minutes  Scissors, Cross [place] 1 minute each  Hang 3 minutes     Stair Stretches: BLE hamstrings/hip flexors/calves 3x30s EACH   Step Ups w/ palms together x 10 each lead LE  Step Downs w/ ambar HR support x 10 each lead LE         OP EDUCATION: paddle placement for proper drag, dynamic balance progression, aquatic HEP planning       "

## 2023-10-19 ENCOUNTER — TREATMENT (OUTPATIENT)
Dept: PHYSICAL THERAPY | Facility: CLINIC | Age: 73
End: 2023-10-19
Payer: MEDICARE

## 2023-10-19 DIAGNOSIS — M48.062 SPINAL STENOSIS, LUMBAR REGION WITH NEUROGENIC CLAUDICATION: ICD-10-CM

## 2023-10-19 PROCEDURE — 97113 AQUATIC THERAPY/EXERCISES: CPT | Mod: GP,CQ

## 2023-10-19 NOTE — PROGRESS NOTES
Physical Therapy    Physical Therapy Treatment    Patient Name: Angelina Becerril  MRN: [id number]  Today's Date: 10/19/2023  Time Calculation  Start Time: 1048  Stop Time: 1133  Time Calculation (min): 45 min      Assessment:   Patient was challenged with coordination with progression of paddle resistance with ambulation this visit, but improved with cueing and repetition. Challenged with progression of eyes closed when performing multi-directional HR/TR, improving with verbal cueing and repetition. Requires verbal cueing for trunk stabilization when performing hip extension. Decreased BLE hip external rotation compensation when performing hip abduction with verbal  cueing. Performed increased reps with improved endurance. Patient is confident in her HEP and is signed up at the [facility] in preparation for her aquatic HEP. Trialed float belt and flotation dumbells for deep end community exercise programming with verbal cueing for BLE control against buoyancy.     Plan:  Re-Assessment NV.  Continue with poc as documented in the legacy system.        Current Problem  1. Spinal stenosis, lumbar region with neurogenic claudication  PT eval and treat          Subjective   General   Patient reports she was able to make chili in the kitchen without a seated break for the first time in a long time. States that she feels stiff and a little sore today, but has immediate pain relief upon entering the pool. Sees her orthopedic surgeon tomorrowto address her neck issues. Has no neck pain today.     Insurance reviewed   Visit number: 11/12   Approved number of visits: 12   Authorization date range: 9/14/23-12/2/23   Authorization required after evaluation   Onset Date: 07/ 17/ 2023       Precautions    Per MD Note: Start water-based physical therapy now. She will continue to wear the brace. She can carry up to 20 to 25 pounds now. No bending or twisting except when she gets into the water  PMH: Anxiety, DM, Cholecystomy, s/p ankle  "surgery; s/p back surgery.      Pain   3/10  Lumbar stiffness  Increases pain: walking  Decreases pain: sitting    Objective   Ambulates on pool deck with reciprocal arm swing and good jeff w/ reciprocal step sequence and decreased BLE heelstrike and toe off  Ascends/Descends stairs w/ reciprocal gait w/ ambar handrail support w/ [place]      Posture   Forward head, rounded shoulders    Treatments:  Aquatic 45 minutes  *Floater**  **Planning for [facility] for AQUA HEP**  C=Current, BTW = Back to Wall, NV = Next Visit, NP = Not Performed, G = Group, NB = non-billable      Forward ambulation, TM T=6 C=10 5 minutes   Retro Ambulation, TM T=3 C=10 4 minutes   FWD/Retro Ambulation w/ BUE open paddle rows  6 laps each  Lateral ambulation w/ BUE open paddle ABD/ADD 6 laps each     C=10 10 4 HR/TR with eyes closed with barbell x 10 each  C=10 march w/ open paddle punches x 10 each  C=10 3 way hip AROM with  barbell and wall support x 15 each ambar against current  C=10 Partial Squats w/ BUE open paddles flex/ext x 10 facing C, x 10 away from C  C=10 4 Way HR/TR w/ palms together x 10 each  C=10 HamCurls w/ barbell support facing away from C x 15 ambar  C=10 hip circumduction w/ barbell and wall support support CW/CCW x 15 each facing C     BTW abdominal draw in w/ diaphragmatic breathing 5\"/10  BTW Can Cans 2x15 NV  BTW DLS BUE closed paddles flex/ext, ABD/ADD symmetrical/reciprocal  x 10 each  BTW DLS closed  board push/pull, up/down x 10 each  WBOS BUE Noodle Press Down x10    Deep End w/ float belt and float triangle  dumbells  Cycle 3 minutes  Scissors, Cross Country 1 minute each  Hang 3 minutes     Stair Stretches: BLE hamstrings/hip flexors/calves 3x30s EACH NV  Step Ups w/ palms together x 10 each lead LE NV  Step Downs w/ ambar HR support x 10 each lead LE NV         OP EDUCATION: paddle placement for proper drag and for coordination, dynamic balance progression, aquatic HEP planning,, trunk stabilization, " avoidance of compensation

## 2023-10-20 ENCOUNTER — OFFICE VISIT (OUTPATIENT)
Dept: NEUROSURGERY | Facility: CLINIC | Age: 73
End: 2023-10-20
Payer: MEDICARE

## 2023-10-20 VITALS
HEART RATE: 76 BPM | BODY MASS INDEX: 29.83 KG/M2 | TEMPERATURE: 97 F | RESPIRATION RATE: 18 BRPM | WEIGHT: 185.6 LBS | DIASTOLIC BLOOD PRESSURE: 72 MMHG | SYSTOLIC BLOOD PRESSURE: 126 MMHG | HEIGHT: 66 IN

## 2023-10-20 DIAGNOSIS — Z98.1 HISTORY OF LUMBAR SPINAL FUSION: ICD-10-CM

## 2023-10-20 DIAGNOSIS — M43.10 SPONDYLOLISTHESIS, GRADE 1: Primary | ICD-10-CM

## 2023-10-20 DIAGNOSIS — G95.9 CERVICAL MYELOPATHY (MULTI): ICD-10-CM

## 2023-10-20 DIAGNOSIS — G56.03 BILATERAL CARPAL TUNNEL SYNDROME: ICD-10-CM

## 2023-10-20 DIAGNOSIS — M48.062 SPINAL STENOSIS, LUMBAR REGION WITH NEUROGENIC CLAUDICATION: ICD-10-CM

## 2023-10-20 PROCEDURE — 1036F TOBACCO NON-USER: CPT | Performed by: NEUROLOGICAL SURGERY

## 2023-10-20 PROCEDURE — 99024 POSTOP FOLLOW-UP VISIT: CPT | Performed by: NEUROLOGICAL SURGERY

## 2023-10-20 PROCEDURE — 1126F AMNT PAIN NOTED NONE PRSNT: CPT | Performed by: NEUROLOGICAL SURGERY

## 2023-10-20 PROCEDURE — 1159F MED LIST DOCD IN RCRD: CPT | Performed by: NEUROLOGICAL SURGERY

## 2023-10-20 ASSESSMENT — PATIENT HEALTH QUESTIONNAIRE - PHQ9
SUM OF ALL RESPONSES TO PHQ9 QUESTIONS 1 & 2: 0
1. LITTLE INTEREST OR PLEASURE IN DOING THINGS: NOT AT ALL
2. FEELING DOWN, DEPRESSED OR HOPELESS: NOT AT ALL

## 2023-10-20 ASSESSMENT — PAIN SCALES - GENERAL: PAINLEVEL: 0-NO PAIN

## 2023-10-20 NOTE — PROGRESS NOTES
[facility] [place]  Department of Neurological Surgery  Post Operative Patient Visit      History of Present Illness:  Angelina Becerril is a 73 y.o. year old female who presents to the spine clinic in a post operative visit.     They are status post decompressive laminectomy L2-3 and L3-4.  This is above the prior fusion from L4 to the sacrum.  She has some significant degenerated disks at L2-3 and L3-4 and her back pain initially was pretty intense.  I think she is making progress in her water therapy.  I would like to advance her to land-based therapy.  I also think that she would do well if she continues to go to her rec center and do water therapy weekly multiple times.  We also discussed her cervical spine and her carpal tunnel syndrome.  I think she can have her carpal tunnels addressed.  She understands that she has significant cervical spondylosis with early myelopathy.  I went over her films again with her today.  I am going to see her back in 3 months because I think we should be talking about surgical intervention.  It would mean that we would be discussing a 4 level anterior cervical disc excision with interbody fusion and plate fixation.  I told her that ideally she should bring another set of ears with her at her next office visit.    14/14 systems reviewed and negative other than what is listed in the history of present illness    Patient Active Problem List   Diagnosis    Irritable bowel syndrome with diarrhea    Onychocryptosis    Onychomycosis    Paronychia of great toe of left foot    Pressure injury of toe of right foot, stage 2 (CMS/HCC)    Carpal tunnel syndrome    Cervical myelopathy (CMS/HCC)    Cervical spondylosis with radiculopathy    Degeneration of intervertebral disc of cervical region with osteophyte of cervical vertebra    History of lumbar spinal fusion    Imbalance    Spinal stenosis, lumbar region with neurogenic claudication    Numbness of anterior thigh     Spondylolisthesis, grade 1     Past Medical History:   Diagnosis Date    Anxiety disorder, unspecified     Anxiety    Personal history of diseases of the skin and subcutaneous tissue     History of psoriasis    Personal history of other diseases of the circulatory system     History of hypertension    Personal history of other endocrine, nutritional and metabolic disease     History of diabetes mellitus    Personal history of other endocrine, nutritional and metabolic disease     History of hyperlipidemia     Past Surgical History:   Procedure Laterality Date    ANKLE SURGERY  11/24/2014    Ankle Surgery    BACK SURGERY  11/24/2014    Back Surgery    CHOLECYSTECTOMY  11/24/2014    Cholecystectomy    COLONOSCOPY  11/24/2014    Complete Colonoscopy    MANDIBLE SURGERY  11/24/2014    Jaw Surgery    OTHER SURGICAL HISTORY  11/24/2014    Treatment Of The Right Foot     Social History     Tobacco Use    Smoking status: Never    Smokeless tobacco: Never   Substance Use Topics    Alcohol use: Not Currently     family history includes Cirrhosis in her mother; Diabetes in her maternal grandmother.    Current Outpatient Medications:     atorvastatin (Lipitor) 20 mg tablet, Take 1 tablet (20 mg) by mouth once daily., Disp: , Rfl:     cholestyramine (Questran) 4 gram packet, Take 2 packets (8 g) by mouth once daily. Mix contents with 6 oz of noncarbonated beverage and swallow., Disp: , Rfl:     clobetasol (Temovate) 0.05 % cream, Apply sparingly to affected area twice daily, Disp: , Rfl:     doxazosin (Cardura) 4 mg tablet, Take 1 tablet (4 mg) by mouth once daily., Disp: , Rfl:     LORazepam (Ativan) 0.5 mg tablet, Take 2 tablets (1 mg) by mouth 2 times a day as needed., Disp: , Rfl:     melatonin 5 mg tablet, Take 1 tablet (5 mg) by mouth once daily at bedtime., Disp: , Rfl:     metFORMIN (Glucophage) 500 mg tablet, Take 1 tablet (500 mg) by mouth 2 times a day with meals., Disp: , Rfl:     metoprolol succinate XL (Toprol-XL)  50 mg 24 hr tablet, Take 1 tablet (50 mg) by mouth once daily., Disp: , Rfl:     multivitamin with minerals iron-free (Centrum Silver), Take 1 tablet by mouth once daily., Disp: , Rfl:     sertraline (Zoloft) 50 mg tablet, Take 1 tablet (50 mg) by mouth once daily., Disp: , Rfl:     triamterene-hydrochlorothiazid (Maxzide-25) 37.5-25 mg tablet, Take 1 tablet by mouth once daily. As directed., Disp: , Rfl:     ciclopirox (Penlac) 8 % solution, USE AS DIRECTED., Disp: , Rfl:     cyclobenzaprine (Flexeril) 5 mg tablet, Take 1-2 tablets (5-10 mg) by mouth 3 times a day as needed for muscle spasms (for back pain.)., Disp: , Rfl:     diazePAM (Valium) 5 mg tablet, TAKE 1 TABLET BY MOUTH 1 HOUR BEFORE MRI THEN MAY TAKE 1 TABLET RIGHT BEFORE ENTERING MRI AS NEEDED, Disp: , Rfl:     gentamicin (Garamycin) 0.1 % cream, Apply once daily right foot ulcer, Disp: , Rfl:     metFORMIN (Glucophage) 1,000 mg tablet, , Disp: , Rfl:     nebivolol (Bystolic) 20 mg tablet, Bystolic 20 MG Oral Tablet  Quantity: 90  Refills: 0      Start : 31-Dec-2013  Active, Disp: , Rfl:     sertraline (Zoloft) 25 mg tablet, Take 1 tablet (25 mg) by mouth once daily. As directed., Disp: , Rfl:   No Known Allergies    Physical Examination:      She is ambulating better she has a good attitude she is walking more erect and sitting with perfect posture.    Results  I personally reviewed and interpreted the imaging results which included plain x-rays of her lumbar spine which show good alignment and good posture.    Assessment/Plan   Angelina Becerril is a 73 y.o. year old female who presents to the spine clinic in a post operative visit. Since surgery they are improving steadily.  We will continue with her therapy program and see her back in 3 months.  We will also talk about her cervical spine surgery at that time.  She can proceed with her carpal tunnel surgery at any point now.      The above clinical summary has been dictated with voice recognition software.  It has not been proofread for grammatical errors, typographical mistakes, or other semantic inconsistencies.    Thank you for visiting our office today. It was our pleasure to take part in your healthcare.     Do not hesitate to call with any questions regarding your plan of care after leaving. My office can be reached at [phone] M-F 8am-4pm.     To clinicians, thank you very much for this kind referral. It is a privilege to partner with you in the care of your patients. My office would be delighted to assist you with any further consultations or with questions regarding the plan of care outlined. Do not hesitate to call the office or contact me directly.     Sincerely,    Terry Moran MD, FAANS, FACS  Board Certified Neurological Surgeon  , Department of Neurological Surgery  [employer]    [facility]  [address] [place] [address]., [address]  [facility] [address]  [place] [address]    [facility]  [address]  [address]  [place] [address]    Phone: [phone]  Fax: [phone]

## 2023-10-24 ENCOUNTER — TREATMENT (OUTPATIENT)
Dept: PHYSICAL THERAPY | Facility: CLINIC | Age: 73
End: 2023-10-24
Payer: MEDICARE

## 2023-10-24 VITALS — OXYGEN SATURATION: 97 % | HEART RATE: 68 BPM

## 2023-10-24 DIAGNOSIS — Z98.1 HISTORY OF LUMBAR SPINAL FUSION: Primary | ICD-10-CM

## 2023-10-24 DIAGNOSIS — M48.062 SPINAL STENOSIS, LUMBAR REGION WITH NEUROGENIC CLAUDICATION: ICD-10-CM

## 2023-10-24 DIAGNOSIS — M43.10 SPONDYLOLISTHESIS, GRADE 1: ICD-10-CM

## 2023-10-24 PROCEDURE — 97530 THERAPEUTIC ACTIVITIES: CPT | Mod: GP

## 2023-10-24 ASSESSMENT — PAIN - FUNCTIONAL ASSESSMENT: PAIN_FUNCTIONAL_ASSESSMENT: 0-10

## 2023-10-24 ASSESSMENT — PAIN SCALES - GENERAL: PAINLEVEL_OUTOF10: 0 - NO PAIN

## 2023-11-01 ENCOUNTER — TREATMENT (OUTPATIENT)
Dept: PHYSICAL THERAPY | Facility: CLINIC | Age: 73
End: 2023-11-01
Payer: MEDICARE

## 2023-11-01 DIAGNOSIS — M43.10 SPONDYLOLISTHESIS, GRADE 1: ICD-10-CM

## 2023-11-01 DIAGNOSIS — M48.062 SPINAL STENOSIS, LUMBAR REGION WITH NEUROGENIC CLAUDICATION: ICD-10-CM

## 2023-11-01 DIAGNOSIS — Z98.1 HISTORY OF LUMBAR SPINAL FUSION: ICD-10-CM

## 2023-11-01 PROCEDURE — 97110 THERAPEUTIC EXERCISES: CPT | Mod: GP

## 2023-11-01 ASSESSMENT — PAIN SCALES - GENERAL: PAINLEVEL_OUTOF10: 5 - MODERATE PAIN

## 2023-11-01 ASSESSMENT — PAIN DESCRIPTION - DESCRIPTORS: DESCRIPTORS: TENDER

## 2023-11-01 ASSESSMENT — PAIN - FUNCTIONAL ASSESSMENT: PAIN_FUNCTIONAL_ASSESSMENT: 0-10

## 2023-11-01 NOTE — PROGRESS NOTES
Physical Therapy    Physical Therapy Progress Report     Patient Name: Angelina Becerril  MRN: [id number]  Today's Date: 11/1/2023  Time Calculation  Start Time: 1217  Stop Time: 1257  Time Calculation (min): 40 min  Billing: Minutes  Treatment:   Therapeutic Exercise:  38    Assessment:  PT Assessment  PT Assessment Results: Decreased strength, Decreased endurance, Decreased mobility, Decreased coordination  Rehab Prognosis: Excellent  Evaluation/Treatment Tolerance: Patient tolerated treatment well  pt tolerated treatment well, did well with progression of exercises from pool to land therapy and with initiation of HEP. pt needs continued work on Proximal hip strengthening, stability, and LE dynamic balance. pt left session with all questions answered.     Plan:  OP PT Plan  Treatment/Interventions: Cryotherapy, Education/ Instruction, Gait training, Manual therapy, Neuromuscular re-education, Self care/ home management, Therapeutic activities, Therapeutic exercises, Biofeedback  PT Plan: Skilled PT, Other (Comment) (Progress prox hip mm strengthening -  Goal will be eventually for patient to have a full gym at [facility] exercise program)  PT Frequency: 1 time per week  Duration: 6 weeks  Onset Date: 07/17/23  Certification Period Start Date: 10/25/23  Certification Period End Date: 12/23/23  Number of Treatments Authorized: 6  Rehab Potential: Excellent  Plan of Care Agreement: Patient  Visit number: 13/18  Approved number of visits: 12 +6 = 18   [employer]     Current Problem  1. Spinal stenosis, lumbar region with neurogenic claudication  Follow Up In Physical Therapy      2. History of lumbar spinal fusion  Follow Up In Physical Therapy      3. Spondylolisthesis, grade 1  Follow Up In Physical Therapy          Subjective   General  Reason for Referral: Spondylolisthesis, Grade 1; s/p Lumbar Spinal Fusion  Referred By: Terry Moran MD  Past Medical History Relevant to Rehab: DM, HTN, OA, OP  Preferred Learning Style:  visual, verbal, written  General Comment: Reports she got a new headboard in her guest bedroom; and she was lifting the box and tried to bend it in half and it fell over and she had a LOB and her R Hip hurts now. Did not hurt her back but she felt it.  Precautions  Precautions  Medical Precautions: Spinal precautions  Pain  Pain Assessment: 0-10  Pain Score: 5 - Moderate pain  Pain Type: Acute pain  Pain Location: Hip  Pain Orientation: Right  Pain Descriptors: Tender  Pain Frequency: Intermittent    Objective   Posture  Posture Comment: Fwd head, rounded shoulders, increased thoracic kyphosis; decreased lumbar lordosis with sitting unsupported    Gait: Walks with a minimal antalgic gait pattern for 75+ ft in clinic with no assistive device.     Treatments:  Therapeutic Exercise  Therapeutic Exercise Performed: Yes  Therapeutic Exercise Activity 1: Nustep Level 3 for 8min to increase functional mobility/tolerance  Therapeutic Exercise Activity 2: Glute Bridges Supine 2x15  Therapeutic Exercise Activity 3: BL LE SLR 2x15 with 1# AW  Therapeutic Exercise Activity 4: Long Sit Hip Flexion Red ball as visual cue for activating quad/hip musculature: 2x10  Therapeutic Exercise Activity 5: Supine Marches Alt with 1# AW 2x15  Therapeutic Exercise Activity 6: SL Hip Circles CW/CCW 2x10 BL  Therapeutic Exercise Activity 7: Clamshells BL 2x15  Therapeutic Exercise Activity 8: Stair: Fwd knee bend stretch BL 2x30s  Therapeutic Exercise Activity 9: Stair: HS stretch; BL 2x30s  Therapeutic Exercise Activity 10: Stair: Gastroc/Soleus Stretch BL 2x30s    OP EDUCATION:  Education  Individual(s) Educated: Patient  Education Provided: Anatomy, Fall Risk, Body Mechanics, Home Exercise Program, Home Safety, POC, Posture, Post-Op Precautions  Risk and Benefits Discussed with Patient/Caregiver/Other: yes  Patient/Caregiver Demonstrated Understanding: yes  Plan of Care Discussed and Agreed Upon: yes  Patient Response to Education:  Patient/Caregiver Verbalized Understanding of Information, Patient/Caregiver Performed Return Demonstration of Exercises/Activities, Patient/Caregiver Asked Appropriate Questions

## 2023-11-08 ENCOUNTER — TREATMENT (OUTPATIENT)
Dept: PHYSICAL THERAPY | Facility: CLINIC | Age: 73
End: 2023-11-08
Payer: MEDICARE

## 2023-11-08 DIAGNOSIS — Z98.1 HISTORY OF LUMBAR SPINAL FUSION: ICD-10-CM

## 2023-11-08 DIAGNOSIS — M48.062 SPINAL STENOSIS, LUMBAR REGION WITH NEUROGENIC CLAUDICATION: ICD-10-CM

## 2023-11-08 DIAGNOSIS — M43.10 SPONDYLOLISTHESIS, GRADE 1: ICD-10-CM

## 2023-11-08 PROCEDURE — 97110 THERAPEUTIC EXERCISES: CPT | Mod: GP

## 2023-11-08 ASSESSMENT — PAIN - FUNCTIONAL ASSESSMENT: PAIN_FUNCTIONAL_ASSESSMENT: 0-10

## 2023-11-08 ASSESSMENT — PAIN SCALES - GENERAL: PAINLEVEL_OUTOF10: 0 - NO PAIN

## 2023-11-08 NOTE — PROGRESS NOTES
Physical Therapy    Physical Therapy Progress Note    Patient Name: Angelina Becerril  MRN: [id number]  Today's Date: 11/8/2023  Time Calculation  Start Time: 1302  Stop Time: 1340  Time Calculation (min): 38 min  Billing: Minutes  Treatment:   Therapeutic Exercise:  38    Assessment:  PT Assessment  PT Assessment Results: Decreased strength, Decreased endurance, Decreased mobility, Decreased coordination  Rehab Prognosis: Excellent  Evaluation/Treatment Tolerance: Patient tolerated treatment well  pt tolerated treatment well, did well with progression of proximal hip strengthening. pt needs continued work on dynamic hip strengthening, core strengthening, and balance. pt left session with all questions answered.     Plan:  OP PT Plan  Treatment/Interventions: Cryotherapy, Education/ Instruction, Gait training, Manual therapy, Neuromuscular re-education, Self care/ home management, Therapeutic activities, Therapeutic exercises, Biofeedback  PT Plan: Skilled PT, Other (Comment) (Progress prox hip mm strengthening - Goal will be eventually for patient to have a full gym at [facility] exercise program; Yoga Ball Seated exercises)  PT Frequency: 1 time per week  Duration: 6 weeks  Onset Date: 07/17/23  Certification Period Start Date: 10/25/23  Certification Period End Date: 12/23/23  Number of Treatments Authorized: 6  Rehab Potential: Excellent  Plan of Care Agreement: Patient  Visit number: 14/18  Approved number of visits: 12 +6 = 18   [employer]     Current Problem  1. Spinal stenosis, lumbar region with neurogenic claudication  Follow Up In Physical Therapy      2. History of lumbar spinal fusion  Follow Up In Physical Therapy      3. Spondylolisthesis, grade 1  Follow Up In Physical Therapy          Subjective   General  Reason for Referral: Spondylolisthesis, Grade 1; s/p Lumbar Spinal Fusion  Referred By: Terry Moran MD  Past Medical History Relevant to Rehab: DM, HTN, OA, OP  Preferred Learning Style: visual, verbal,  written  General Comment: Reports she has done the exercises 2x since last visit. Reports they are still challenging to complete. No Falls/  Precautions  Precautions  Medical Precautions: Spinal precautions  Pain  Pain Assessment: 0-10  Pain Score: 0 - No pain    Objective   Posture  Posture Comment: Fwd head, rounded shoulders, increased thoracic kyphosis; decreased lumbar lordosis with sitting unsupported    Treatments:  Therapeutic Exercise  Therapeutic Exercise Performed: Yes  Therapeutic Exercise Activity 1: Nustep Level 3 for 8min to increase functional mobility/tolerance  Therapeutic Exercise Activity 2: Glute Bridges Supine with adductor ball squeeze 2x15  Therapeutic Exercise Activity 3: BL LE SLR 2x15  Therapeutic Exercise Activity 4: Long Sit Hip Flexion Red ball as visual cue for activating quad/hip musculature: 3x15  Therapeutic Exercise Activity 5: Supine Marches Alt with 1# AW 2x15  Therapeutic Exercise Activity 8: Stair: Fwd knee bend stretch BL 2x30s  Therapeutic Exercise Activity 9: Stair: HS stretch; BL 2x30s  Therapeutic Exercise Activity 10: Stair: Gastroc/Soleus Stretch BL 2x30s  Therapeutic Exercise Activity 11: Mini Squats 2x15  Therapeutic Exercise Activity 12: 3 Way Hip: 1# AW 2x15 each BL    OP EDUCATION:  Education  Individual(s) Educated: Patient  Education Provided: Anatomy, Fall Risk, Body Mechanics, Home Exercise Program, Home Safety, POC, Posture, Post-Op Precautions  Risk and Benefits Discussed with Patient/Caregiver/Other: yes  Patient/Caregiver Demonstrated Understanding: yes  Plan of Care Discussed and Agreed Upon: yes  Patient Response to Education: Patient/Caregiver Verbalized Understanding of Information, Patient/Caregiver Performed Return Demonstration of Exercises/Activities, Patient/Caregiver Asked Appropriate Questions

## 2023-11-15 ENCOUNTER — TREATMENT (OUTPATIENT)
Dept: PHYSICAL THERAPY | Facility: CLINIC | Age: 73
End: 2023-11-15
Payer: MEDICARE

## 2023-11-15 DIAGNOSIS — M48.062 SPINAL STENOSIS, LUMBAR REGION WITH NEUROGENIC CLAUDICATION: ICD-10-CM

## 2023-11-15 DIAGNOSIS — Z98.1 HISTORY OF LUMBAR SPINAL FUSION: ICD-10-CM

## 2023-11-15 DIAGNOSIS — M43.10 SPONDYLOLISTHESIS, GRADE 1: ICD-10-CM

## 2023-11-15 PROCEDURE — 97112 NEUROMUSCULAR REEDUCATION: CPT | Mod: GP

## 2023-11-15 PROCEDURE — 97110 THERAPEUTIC EXERCISES: CPT | Mod: GP

## 2023-11-15 ASSESSMENT — PAIN - FUNCTIONAL ASSESSMENT: PAIN_FUNCTIONAL_ASSESSMENT: 0-10

## 2023-11-15 ASSESSMENT — PAIN SCALES - GENERAL: PAINLEVEL_OUTOF10: 0 - NO PAIN

## 2023-11-15 NOTE — PROGRESS NOTES
Physical Therapy    Physical Therapy Progress Note    Patient Name: Angelina Becerril  MRN: [id number]  Today's Date: 11/15/2023  Time Calculation  Start Time: 1131  Stop Time: 1210  Time Calculation (min): 39 min  Billing:  Treatment:   Therapeutic Exercise:  29  NMR:  10    Assessment:  PT Assessment  PT Assessment Results: Decreased strength, Decreased endurance, Decreased mobility, Decreased coordination  Rehab Prognosis: Excellent  Evaluation/Treatment Tolerance: Patient tolerated treatment well  pt tolerated treatment well, did well with progression of supine exercises with ankle weights. Pt has increased difficulty pt needs continued work on core strengthening and increasing tolerance with activity. pt left session with all questions answered.     Plan:  OP PT Plan  Treatment/Interventions: Cryotherapy, Education/ Instruction, Gait training, Manual therapy, Neuromuscular re-education, Self care/ home management, Therapeutic activities, Therapeutic exercises, Biofeedback  PT Frequency: 1 time per week  Duration: 6 weeks  Onset Date: 07/17/23  Certification Period Start Date: 10/25/23  Certification Period End Date: 12/23/23  Number of Treatments Authorized: 6  Rehab Potential: Excellent  Plan of Care Agreement: Patient  Visit number: 15/18  Approved number of visits: 12 +6 = 18   [employer]     Current Problem  1. Spinal stenosis, lumbar region with neurogenic claudication  Follow Up In Physical Therapy      2. History of lumbar spinal fusion  Follow Up In Physical Therapy      3. Spondylolisthesis, grade 1  Follow Up In Physical Therapy          Subjective   General  Reason for Referral: Spondylolisthesis, Grade 1; s/p Lumbar Spinal Fusion  Referred By: Terry Moran MD  Past Medical History Relevant to Rehab: DM, HTN, OA, OP  Preferred Learning Style: visual, verbal, written  General Comment: Reports she has been able to do the exercises partially; has been feeling sick the last couple of days but today feels  s lot better. Reports no falls.  Precautions  Precautions  Medical Precautions: Spinal precautions  Pain  Pain Assessment: 0-10  Pain Score: 0 - No pain    Objective   Posture  Posture Comment: Fwd head, rounded shoulders, increased thoracic kyphosis; decreased lumbar lordosis with sitting unsupported    Treatments:  Therapeutic Exercise  Therapeutic Exercise Performed: Yes  Therapeutic Exercise Activity 1: Nustep Level 3 for 10 min to increase functional mobility/tolerance  Therapeutic Exercise Activity 2: Glute Bridges Supine with adductor ball squeeze 2x15  Therapeutic Exercise Activity 3: BL LE SLR 2x10 with  2# AW  Therapeutic Exercise Activity 4: Long Sit Hip Flexion Red ball as visual cue for activating quad/hip musculature: 3x15  Therapeutic Exercise Activity 5: Supine Marches Alt with 2# AW 2x15  Therapeutic Exercise Activity 8: Stair: Fwd knee bend stretch BL 2x30s  Therapeutic Exercise Activity 9: Stair: HS stretch; BL 2x30s  Therapeutic Exercise Activity 10: Stair: Gastroc/Soleus Stretch BL 2x30s  Therapeutic Exercise Activity 11: Sit to/from stands 20' with no use of UE assist BL 2x10    Balance/Neuromuscular Re-Education  Balance/Neuromuscular Re-Education Activity Performed: Yes  Balance/Neuromuscular Re-Education Activity 1: Exercise Physio Ball Green - Seated Marches BL 3x10  Balance/Neuromuscular Re-Education Activity 2: Exercise Physio Ball Diagonal Chop Motion BL x10 each  Balance/Neuromuscular Re-Education Activity 3: Exercise Physio Ball Green - UE Arm Raises ALT with 1# HW with maintaining tight core 2x10    OP EDUCATION:  Education  Individual(s) Educated: Patient  Education Provided: Anatomy, Fall Risk, Body Mechanics, Home Exercise Program, Home Safety, POC, Posture, Post-Op Precautions  Risk and Benefits Discussed with Patient/Caregiver/Other: yes  Patient/Caregiver Demonstrated Understanding: yes  Plan of Care Discussed and Agreed Upon: yes  Patient Response to Education:  Patient/Caregiver Verbalized Understanding of Information, Patient/Caregiver Performed Return Demonstration of Exercises/Activities, Patient/Caregiver Asked Appropriate Questions

## 2023-11-20 ENCOUNTER — TREATMENT (OUTPATIENT)
Dept: PHYSICAL THERAPY | Facility: CLINIC | Age: 73
End: 2023-11-20
Payer: MEDICARE

## 2023-11-20 DIAGNOSIS — M48.062 SPINAL STENOSIS, LUMBAR REGION WITH NEUROGENIC CLAUDICATION: ICD-10-CM

## 2023-11-20 DIAGNOSIS — Z98.1 HISTORY OF LUMBAR SPINAL FUSION: ICD-10-CM

## 2023-11-20 DIAGNOSIS — M43.10 SPONDYLOLISTHESIS, GRADE 1: ICD-10-CM

## 2023-11-20 PROCEDURE — 97110 THERAPEUTIC EXERCISES: CPT | Mod: GP

## 2023-11-20 PROCEDURE — 97112 NEUROMUSCULAR REEDUCATION: CPT | Mod: GP

## 2023-11-20 ASSESSMENT — PAIN SCALES - GENERAL: PAINLEVEL_OUTOF10: 0 - NO PAIN

## 2023-11-20 ASSESSMENT — PAIN - FUNCTIONAL ASSESSMENT: PAIN_FUNCTIONAL_ASSESSMENT: 0-10

## 2023-11-20 NOTE — PROGRESS NOTES
Physical Therapy    Physical Therapy Progress Note    Patient Name: Angelina Becerril  MRN: [id number]  Today's Date: 11/20/2023  Time Calculation  Start Time: 1015  Stop Time: 1053  Time Calculation (min): 38 min  Billing:  Treatment:   Therapeutic Exercise:  15  NMR:  23      Assessment:  PT Assessment  PT Assessment Results: Decreased strength, Decreased endurance, Decreased mobility, Decreased coordination  Rehab Prognosis: Excellent  Evaluation/Treatment Tolerance: Patient tolerated treatment well  pt tolerated treatment well, did well with progression of dynamic strengthening in the core and balance. pt needs continued work on increasing core strength. pt left session with all questions answered.     Plan:  OP PT Plan  Treatment/Interventions: Cryotherapy, Education/ Instruction, Gait training, Manual therapy, Neuromuscular re-education, Self care/ home management, Therapeutic activities, Therapeutic exercises, Biofeedback  PT Plan: Skilled PT, Other (Comment) (Standing proximal hip strengthening, blaze pods)  PT Frequency: 1 time per week  Duration: 6 weeks  Onset Date: 07/17/23  Certification Period Start Date: 10/25/23  Certification Period End Date: 12/23/23  Number of Treatments Authorized: 6  Rehab Potential: Excellent  Plan of Care Agreement: Patient  Visit number: 16/18  Approved number of visits: 12 +6 = 18   [employer]     Current Problem  1. Spinal stenosis, lumbar region with neurogenic claudication  Follow Up In Physical Therapy      2. History of lumbar spinal fusion  Follow Up In Physical Therapy      3. Spondylolisthesis, grade 1  Follow Up In Physical Therapy          General  PT  Visit  PT Received On: 11/20/23  Response to Previous Treatment: Patient with no complaints from previous session.  General  Reason for Referral: Spondylolisthesis, Grade 1; s/p Lumbar Spinal Fusion  Referred By: Terry Moran MD  Past Medical History Relevant to Rehab: DM, HTN, OA, OP  General Comment: No falls; HEP  going well and she states they were better this week to complete. Reports she was walking a lot more - 15 min increments.    Subjective    Precautions  Precautions  Medical Precautions: Spinal precautions    Pain  Pain Assessment  Pain Assessment: 0-10  Pain Score: 0 - No pain    Objective   Posture  Postural Control  Posture Comment: Fwd head, rounded shoulders, increased thoracic kyphosis; decreased lumbar lordosis with sitting unsupported    Core Strength: Good ( - ) seen with ability to maintain upright posture on exercise physio ball unsupported.     Treatments:  Therapeutic Exercise  Therapeutic Exercise Performed: Yes  Therapeutic Exercise Activity 1: Nustep Level 3 for 8 min to increase functional mobility/tolerance  Therapeutic Exercise Activity 8: Stair: Fwd knee bend stretch BL 2x30s  Therapeutic Exercise Activity 9: Stair: HS stretch; BL 2x30s  Therapeutic Exercise Activity 10: Stair: Gastroc/Soleus Stretch BL 2x30s  Therapeutic Exercise Activity 11: Sit to/from stands 20' with no use of UE assist BL 3x10    Balance/Neuromuscular Re-Education  Balance/Neuromuscular Re-Education Activity Performed: Yes  Balance/Neuromuscular Re-Education Activity 1: Exercise Physio Ball Blue - Seated Marches BL 3x10  Balance/Neuromuscular Re-Education Activity 2: Exercise Physio Ball Blue - LAQ 3x10  Balance/Neuromuscular Re-Education Activity 3: Air X Pads - Squats 2x10  Balance/Neuromuscular Re-Education Activity 4: Blaze Pods: half moon (4 pods) stepping; no UE balance support; R Stepping; max taps  Balance/Neuromuscular Re-Education Activity 5: Blaze Pods: half moon (4 pods) stepping; no UE balance support; L Stepping; max taps  Balance/Neuromuscular Re-Education Activity 6: Blaze Pods: half moon (4 pods) stepping; no UE balance support; L/RStepping; max taps; dual task colors    OP EDUCATION:  Outpatient Education  Individual(s) Educated: Patient  Education Provided: Anatomy, Fall Risk, Body Mechanics, Home Exercise  Program, Home Safety, POC, Posture, Post-Op Precautions  Risk and Benefits Discussed with Patient/Caregiver/Other: yes  Patient/Caregiver Demonstrated Understanding: yes  Plan of Care Discussed and Agreed Upon: yes  Patient Response to Education: Patient/Caregiver Verbalized Understanding of Information, Patient/Caregiver Performed Return Demonstration of Exercises/Activities, Patient/Caregiver Asked Appropriate Questions

## 2023-11-29 ENCOUNTER — TREATMENT (OUTPATIENT)
Dept: PHYSICAL THERAPY | Facility: CLINIC | Age: 73
End: 2023-11-29
Payer: MEDICARE

## 2023-11-29 DIAGNOSIS — M43.10 SPONDYLOLISTHESIS, GRADE 1: ICD-10-CM

## 2023-11-29 DIAGNOSIS — M48.062 SPINAL STENOSIS, LUMBAR REGION WITH NEUROGENIC CLAUDICATION: ICD-10-CM

## 2023-11-29 DIAGNOSIS — Z98.1 HISTORY OF LUMBAR SPINAL FUSION: ICD-10-CM

## 2023-11-29 PROCEDURE — 97112 NEUROMUSCULAR REEDUCATION: CPT | Mod: GP

## 2023-11-29 PROCEDURE — 97110 THERAPEUTIC EXERCISES: CPT | Mod: GP

## 2023-11-29 ASSESSMENT — PAIN SCALES - GENERAL: PAINLEVEL_OUTOF10: 3

## 2023-11-29 ASSESSMENT — PAIN - FUNCTIONAL ASSESSMENT: PAIN_FUNCTIONAL_ASSESSMENT: 0-10

## 2023-11-29 ASSESSMENT — PAIN DESCRIPTION - DESCRIPTORS: DESCRIPTORS: TIGHTNESS

## 2023-11-29 NOTE — PROGRESS NOTES
Physical Therapy    Physical Therapy Progress Note    Patient Name: Angelina Becerril  MRN: [id number]  Today's Date: 11/29/2023  Time Calculation  Start Time: 1415  Stop Time: 1455  Time Calculation (min): 40 min  Billing:  Treatment:   Therapeutic Exercise:  17  NMR:  23    Assessment:  PT Assessment  PT Assessment Results: Decreased strength, Decreased endurance, Decreased mobility, Decreased coordination  Rehab Prognosis: Excellent  Evaluation/Treatment Tolerance: Patient tolerated treatment well  pt tolerated treatment well, did well with progression of core strengthening, balance is still an area that can be improved as seen with decreased balance during blaze pod activity with L LE SLS. Pt is scheduled for a re-check next visit . pt left session with all questions answered.     Plan:  OP PT Plan  Treatment/Interventions: Cryotherapy, Education/ Instruction, Gait training, Manual therapy, Neuromuscular re-education, Self care/ home management, Therapeutic activities, Therapeutic exercises, Biofeedback  PT Plan: Skilled PT, Other (Comment) (Re-Check)  PT Frequency: 1 time per week  Duration: 6 weeks  Onset Date: 07/17/23  Certification Period Start Date: 10/25/23  Certification Period End Date: 12/23/23  Number of Treatments Authorized: 6  Rehab Potential: Excellent  Plan of Care Agreement: Patient  Visit number: 17/18  Approved number of visits: 12 +6 = 18   [employer]     Current Problem  1. Spinal stenosis, lumbar region with neurogenic claudication  Follow Up In Physical Therapy      2. History of lumbar spinal fusion  Follow Up In Physical Therapy      3. Spondylolisthesis, grade 1  Follow Up In Physical Therapy          General  PT  Visit  PT Received On: 11/29/23  Response to Previous Treatment: Patient with no complaints from previous session.  General  Reason for Referral: Spondylolisthesis, Grade 1; s/p Lumbar Spinal Fusion  Referred By: Terry Moran MD  Past Medical History Relevant to Rehab: DM, HTN,  OA, OP  Preferred Learning Style: visual, verbal, written  General Comment: Per pt report has been having a lot of activity the last week; no falls reported. She does report though increased tightness in the quads due to lots of increased activity. She does report she has increased tolerance with standing    Subjective    Precautions  Precautions  Medical Precautions: Spinal precautions  Pain  Pain Assessment  Pain Assessment: 0-10  Pain Score: 3  Pain Type: Acute pain  Pain Location:  (BL Quads  and back)  Pain Orientation: Left, Right  Pain Descriptors: Tightness    Objective   Posture  Postural Control  Posture Comment: Fwd head, rounded shoulders, increased thoracic kyphosis; decreased lumbar lordosis with sitting unsupported    Balance: Able to maintain L SLS for >5s, R SLS >7s with blaze pod stepping activities    Treatments:  Therapeutic Exercise  Therapeutic Exercise Performed: Yes  Therapeutic Exercise Activity 1: Nustep Level 3 for 8 min to increase functional mobility/tolerance  Therapeutic Exercise Activity 8: Stair: Fwd knee bend stretch BL 2x30s  Therapeutic Exercise Activity 9: Stair: HS stretch; BL 2x30s  Therapeutic Exercise Activity 10: Stair: Gastroc/Soleus Stretch BL 2x30s - incline board  Therapeutic Exercise Activity 11: Sit to/from stands 20' with no use of UE assist BL 3x10    Balance/Neuromuscular Re-Education  Balance/Neuromuscular Re-Education Activity Performed: Yes  Balance/Neuromuscular Re-Education Activity 1: Exercise Physio Ball Blue - Seated Marches BL 3x10  Balance/Neuromuscular Re-Education Activity 2: Exercise Physio Ball Blue - LAQ 3x10  Balance/Neuromuscular Re-Education Activity 3: Exercise Physio Ball Blue: Seated March + LAQ x10 BL: CGA with Gait Belt  Balance/Neuromuscular Re-Education Activity 4: Blaze Pods: half moon (4 pods) stepping; no UE balance support; R Stepping; max taps; CGA at times  Balance/Neuromuscular Re-Education Activity 5: Blaze Pods: half moon (4 pods)  stepping; no UE balance support; L Stepping; max taps; GA at times  Balance/Neuromuscular Re-Education Activity 6: Blaze Pods: half moon (4 pods) stepping; no UE balance support; L/RStepping; max taps; dual task colors: Pink R; Green L    OP EDUCATION:  Outpatient Education  Individual(s) Educated: Patient  Education Provided: Anatomy, Fall Risk, Body Mechanics, Home Exercise Program, Home Safety, POC, Posture, Post-Op Precautions  Risk and Benefits Discussed with Patient/Caregiver/Other: yes  Patient/Caregiver Demonstrated Understanding: yes  Plan of Care Discussed and Agreed Upon: yes  Patient Response to Education: Patient/Caregiver Verbalized Understanding of Information, Patient/Caregiver Performed Return Demonstration of Exercises/Activities, Patient/Caregiver Asked Appropriate Questions

## 2023-12-06 ENCOUNTER — TREATMENT (OUTPATIENT)
Dept: PHYSICAL THERAPY | Facility: CLINIC | Age: 73
End: 2023-12-06
Payer: MEDICARE

## 2023-12-06 DIAGNOSIS — M48.062 SPINAL STENOSIS, LUMBAR REGION WITH NEUROGENIC CLAUDICATION: ICD-10-CM

## 2023-12-06 DIAGNOSIS — M43.10 SPONDYLOLISTHESIS, GRADE 1: ICD-10-CM

## 2023-12-06 DIAGNOSIS — Z98.1 HISTORY OF LUMBAR SPINAL FUSION: ICD-10-CM

## 2023-12-06 PROCEDURE — 97530 THERAPEUTIC ACTIVITIES: CPT | Mod: GP

## 2023-12-06 PROCEDURE — 97110 THERAPEUTIC EXERCISES: CPT | Mod: GP

## 2023-12-06 ASSESSMENT — BALANCE ASSESSMENTS
TURN 360 DEGREES: ABLE TO TURN 360 DEGREES SAFELY IN 4 SECONDS OR LESS
PLACE ALTERNATE FOOT ON STEP OR STOOL WHILE STANDING UNSUPPORTED: ABLE TO STAND INDEPENDENTLY AND SAFELY AND COMPLETE 8 STEPS IN 20 SECONDS
STANDING UNSUPPORTED WITH FEET TOGETHER: ABLE TO PLACE FEET TOGETHER INDEPENDENTLY AND STAND 1 MINUTE SAFELY
STANDING UNSUPPORTED ONE FOOT IN FRONT: ABLE TO PLACE FOOT TANDEM INDEPENDENTLY AND HOLD 30 SECONDS
STANDING TO SITTING: SITS SAFELY WITH MINIMAL USE OF HANDS
STANDING UNSUPPORTED: ABLE TO STAND SAFELY FOR 2 MINUTES
TRANSFERS: ABLE TO TRANSFER SAFELY WITH MINOR USE OF HANDS
PICK UP OBJECT FROM THE FLOOR FROM A STANDING POSITION: ABLE TO PICK UP SLIPPER SAFELY AND EASILY
PLACE ALTERNATE FOOT ON STEP OR STOOL WHILE STANDING UNSUPPORTED: LOOKS BEHIND FROM BOTH SIDES AND WEIGHT SHIFTS WELL
REACHING FORWARD WITH OUTSTRETCHED ARM WHILE STANDING: CAN REACH FORWARD CONFIDENTLY 25 CM (10 INCHES)
STANDING UNSUPPORTED WITH EYES CLOSED: ABLE TO STAND 10 SECONDS SAFELY
SITTING WITH BACK UNSUPPORTED BUT FEET SUPPORTED ON FLOOR OR ON A STOOL: ABLE TO SIT SAFELY AND SECURELY FOR 2 MINUTES
LONG VERSION TOTAL SCORE (MAX 56): 54
STANDING ON ONE LEG: ABLE TO LIFT LEG INDEPENDENTLY AND HOLD GREATER THAN OR EQUAL TO 3 SECONDS
STANDING TO SITTING: ABLE TO STAND WITHOUT USING HANDS AND STABILIZE INDEPENDENTLY

## 2023-12-06 ASSESSMENT — PAIN SCALES - GENERAL: PAINLEVEL_OUTOF10: 0 - NO PAIN

## 2023-12-06 ASSESSMENT — PAIN - FUNCTIONAL ASSESSMENT: PAIN_FUNCTIONAL_ASSESSMENT: 0-10

## 2023-12-06 NOTE — PROGRESS NOTES
Physical Therapy    Physical Re-Assessment & Discharge     Patient Name: Angelina Becerril  MRN: [id number]  Today's Date: 12/6/2023  Time Calculation  Start Time: 1000  Stop Time: 1038  Time Calculation (min): 38 min  Billing:  Treatment:   Therapeutic Exercise:  10  Therapeutic Activity:  28    Assessment:  PT Assessment  PT Assessment Results: Decreased strength, Impaired balance  Rehab Prognosis: Excellent  Evaluation/Treatment Tolerance: Patient tolerated treatment well  Since initial evaluation, pt has met all therapeutic goals. pt is discharged from skilled PT due to returning to near functional baseline prior to surgery and meeting all goals. pt verbally agrees to DC at this time. pt left session with all questions answered.     Plan: DISCHARGE  OP PT Plan  PT Plan: No Additional PT interventions required at this time  Visit number: 18/18  Approved number of visits: 12 +6 = 18   [employer] Medicare     Current Problem  1. Spinal stenosis, lumbar region with neurogenic claudication  Follow Up In Physical Therapy      2. History of lumbar spinal fusion  Follow Up In Physical Therapy      3. Spondylolisthesis, grade 1  Follow Up In Physical Therapy          General  PT  Visit  PT Received On: 12/06/23  Response to Previous Treatment: Patient with no complaints from previous session.  General  Reason for Referral: Spondylolisthesis, Grade 1; s/p Lumbar Spinal Fusion  Referred By: Terry Moran MD  Past Medical History Relevant to Rehab: DM, HTN, OA, OP  Preferred Learning Style: visual, verbal, written  General Comment: Reports no falls; has been completing HEP according to plan. Is ready for discharge. Still reportstightness in the quads with stair climbing. Reports she is 80% recovered from surgery. Her endurance has increased greatly from baseline.    Subjective    Precautions  Precautions  Medical Precautions: Spinal precautions  Pain  Pain Assessment  Pain Assessment: 0-10  Pain Score: 0 - No pain    Objective    Posture  Posture Comment: Fwd head, rounded shoulders, increased thoracic kyphosis; decreased lumbar lordosis with sitting unsupported     Extremity/Trunk Assessment  Gait: Ambulates in clinic for 75+ft with no assistive device, good jeff, slight trunk lean to the R LE to off weight L LE with swing and slight trunk lean to the L LE to off weight the R LE with Swing phase of gait.      Stairs: ascends/descends 4 6in steps while no use of HR step over step pattern; good jeff, reciprocal pattern     RLE   RLE : Exceptions to WFL  Strength RLE  R Hip Flexion: 4/5 -->4+/5  R Hip Extension: 3+/5 --> 4-/5  R Hip ABduction: 4/5 -->4+/5  R Hip ADduction: 4-/5 -->4+/5  R Knee Flexion: 4+/5 -->5/5  R Knee Extension: 4+/5 -->5/5  R Ankle Dorsiflexion: 5/5 -->5/5  R Ankle Plantar Flexion: 5/5 -->5/5    LLE   LLE : Exceptions to WFL  Strength LLE  L Hip Flexion: 4+/5 --> 4+/5  L Hip Extension: 3+/5 --> 4/5  L Hip ABduction: 4/5 -->4+/5  L Hip ADduction: 4-/5 --> 4+/5  L Knee Flexion: 4+/5 --> 5/5  L Knee Extension: 4+/5 --> 5/5  L Ankle Dorsiflexion: 5/5 --> 5/5  L Ankle Plantar Flexion: 5/5 -->5/5     Lumbar Spine  Lumbar Palpation/Joint Mobility Assessment  Palpation/Joint Mobility Comment: No pain with palpation to the L/S and T/S  Lumbar AROM  Lumbar AROM WFL: yes  Hip AROM  Hip AROM WFL: yes    Lumbar Myotomes  Lumbar Myotomes WFL: no  R Hip Flex : 4/5 --> 4+/5  L Hip Flex (L2): (5/5): 4+/5 --> 4+/5  R Ankle DF (L4): (5/5): 5/5 --> SAME  L Ankle DF (L4): (5/5): 5/5 --> SAME  R Great Toe Ext (L5): (5/5) : 5/5 --> SAME  L Great Toe Ext (L5): (5/5): 5/5 --> SAME  R Ankle EV (S1): (5/5): 5/5 --> SAME  L Ankle EV (S1): (5/5): 5/5 --> SAME    Specific Lower Extremity MMT  Specific Lower Extremity MMT WFL: no  R Iliopsoas: (5/5): 4/5 --> 4+/5  L Iliopsoas: (5/5): 4+/5 --> --> SAME  R Gluteals (prone): (5/5): 3+/5 --> 4-/5  L Gluteals (prone): (5/5): 3+/5 --> 4/5  R Hip External Rotation: (5/5): 5/5 --> SAME  L Hip External  Rotation: (5/5): 5/5 --> SAME    Lumbar Extensors - Bridge - GOOD   Lumbar Flexors - Crunch - Fair (-)      Dermatomes --> SAME  Dermatomes WFL: yes  R Anterior / Medial thigh ( L2, L3): WFL  L Anterior / Medial thigh ( L2, L3): WFL  R Medial ankle (L4): WFL  L Medial ankle (L4): WFL  R Lateral calf/dorsal/plantar surface of foot, digit 1-4 (L5): WFL  L Lateral calf/dorsal/plantar surface of foot, digit 1-4 (L5): WFL  R Digit 5 (S1): WFL  L Digit 5 (S1): WFL    Special Tests  Supine SLR: (Negative): Negative BL      Balance: 4 Stage  NBOS: 10s  Tandem: 10s  Semi Tandem: 10s   SLS R: 2s --> 3s  SLS L: 3s     Outcome Measures:  Purdy Balance Scale  1. Sitting to Standing: Able to stand without using hands and stabilize independently  2. Standing Unsupported: Able to stand safely for 2 minutes  3. Sitting with Back Unsupported but Feet Supported on Floor or on a Stool: Able to sit safely and securely for 2 minutes  4. Standing to Sitting: Sits safely with minimal use of hands  5.  Transfers: Able to transfer safely with minor use of hands  6. Standing Unsupported with Eyes Closed: Able to stand 10 seconds safely  7. Standing Unsupported with Feet Together: Able to place feet together independently and stand 1 minute safely  8. Reach Forward with Outstretched Arm While Standing: Can reach forward confidently 25 cm (10 inches)  9.  Object from Floor from a Standing Position: Able to  slipper safely and easily  10. Turning to Look Behind Over Left and Right Shoulders While Standing: Looks behind from both sides and weight shifts well  11. Turn 360 Degrees: Able to turn 360 degrees safely in 4 seconds or less  12. Place Alternate Foot on Step or Stool While Standing Unsupported: Able to stand independently and safely and complete 8 steps in 20 seconds  13. Standing Unsupported One Foot in Front: Able to place foot tandem independently and hold 30 seconds  14. Standing on One Leg: Able to lift leg independently  and hold greater than or equal to 3 seconds  Purdy Balance Score: 54    Timed Up and Go Test  TUG Comment: 7.43s with no AD    Other Measures  5x Sit to Stand: 11.23s; standardized chair with no UE assist  Oswestry Disablity Index (JERMAIN): 9/50  Treatments:  Therapeutic Exercise  Therapeutic Exercise Performed: Yes  Therapeutic Exercise Activity 1: Nustep Level 4 for 10 min to increase functional mobility/tolerance    Therapeutic Activity  Therapeutic Activity Performed: Yes  Therapeutic Activity 1: Re-Assess & DC  1. Repeated sit to/from stands from standardized chair height. Required VC for no use of hands, nose over toes, full upright stand from chair, use of anterior shift with fwd momentum and eccentric control into chair.  2. Functional Performance via gait analysis of TUG: Verbal cues for sequencing/positioning. 2x10' at SBA.   3. Functional mobility via AROM/PROM of LE; Verbal cues for sequencing/positioning.  4. Functional Performance via MMT of LE: Hip, knee, ankle; Verbal cues for sequencing/positioning.   5. Functional Performance via Stairs  6. Functional Performance of balance via 4 Stage Purdy  7. Educated pt on MET POC, MET goals of physical therapy, as well as the benefits in participating in physical therapy outside of rehab to increase functional mobility and maximize pt safety.     OP EDUCATION:  Outpatient Education  Individual(s) Educated: Patient  Education Provided: Anatomy, Fall Risk, Body Mechanics, Home Exercise Program, Home Safety, POC, Posture, Post-Op Precautions  Risk and Benefits Discussed with Patient/Caregiver/Other: yes  Patient/Caregiver Demonstrated Understanding: yes  Plan of Care Discussed and Agreed Upon: yes  Patient Response to Education: Patient/Caregiver Verbalized Understanding of Information, Patient/Caregiver Performed Return Demonstration of Exercises/Activities, Patient/Caregiver Asked Appropriate Questions    Goals: Re-Check 10/24/23; DC 12/6/23 - Adequate for DC      STG: Pt will be independent in HEP & symptom management (ME)      LTGs:  Pt will demonstrate 2pt improvement on the VAS pain scale, allowing for improved tolerance of functional activities. (MET)      Pt will demonstrate no losses in all lumbar AROM, without onset of pain, allowing for the ability to perform functional activities. (MET)      Pt will not have exacerbations of symptoms during completion of cooking and household chores in standing > 15min (MET)      Pt will meet above goals allowing pt to return to walking >10min. (MET, [id number])      pt will improve Oswestry (JERMAIN) score by at least 12 points (minimal clinically important difference) in order to reflect improvement in ADL's/pain reduction. Baseline 09/14/2023: 16/50, (JERMAIN score of </=10/50 (</=20%) represents minimal to no disability) --> 14/50 (Re-Check) --> 9/50 (DC)      5xSTS: pt will perform 5x sit to  < 15 seconds to decrease fall risk. Baseline 09/14/2023: 16.44s sec --> 14.83s (Re-Check) --> 11.23s (DC)      TUG: pt will complete TUG within 12 seconds or less (indicative of higher fall risk) in order to INC balance and enhance safety during ADLs/ IADLs. OR 3.4s MCID (> or equal to 12 seconds indicates high risk for falls in older adults. 11-20 seconds is normal for the frail and elderly.) Baseline [mm/dd/yyyy]: 8.7s sec  --> 8.02s (Re-Check) --> 7.43s (DC)

## 2023-12-22 DIAGNOSIS — M48.062 SPINAL STENOSIS, LUMBAR REGION WITH NEUROGENIC CLAUDICATION: Primary | ICD-10-CM

## 2023-12-22 DIAGNOSIS — M43.10 SPONDYLOLISTHESIS, GRADE 1: ICD-10-CM

## 2023-12-26 ENCOUNTER — ANCILLARY PROCEDURE (OUTPATIENT)
Dept: RADIOLOGY | Facility: CLINIC | Age: 73
End: 2023-12-26
Payer: MEDICARE

## 2023-12-26 DIAGNOSIS — M48.062 SPINAL STENOSIS, LUMBAR REGION WITH NEUROGENIC CLAUDICATION: ICD-10-CM

## 2023-12-26 DIAGNOSIS — M43.10 SPONDYLOLISTHESIS, GRADE 1: ICD-10-CM

## 2023-12-26 PROCEDURE — 72100 X-RAY EXAM L-S SPINE 2/3 VWS: CPT | Performed by: RADIOLOGY

## 2023-12-26 PROCEDURE — 72100 X-RAY EXAM L-S SPINE 2/3 VWS: CPT

## 2024-01-18 ENCOUNTER — OFFICE VISIT (OUTPATIENT)
Dept: NEUROSURGERY | Facility: CLINIC | Age: 74
End: 2024-01-18
Payer: MEDICARE

## 2024-01-18 VITALS
WEIGHT: 175 LBS | HEART RATE: 69 BPM | DIASTOLIC BLOOD PRESSURE: 64 MMHG | HEIGHT: 66 IN | TEMPERATURE: 97.7 F | SYSTOLIC BLOOD PRESSURE: 160 MMHG | BODY MASS INDEX: 28.12 KG/M2

## 2024-01-18 DIAGNOSIS — M50.30 DEGENERATION OF INTERVERTEBRAL DISC OF CERVICAL REGION WITH OSTEOPHYTE OF CERVICAL VERTEBRA: ICD-10-CM

## 2024-01-18 DIAGNOSIS — M48.062 SPINAL STENOSIS, LUMBAR REGION WITH NEUROGENIC CLAUDICATION: Primary | ICD-10-CM

## 2024-01-18 DIAGNOSIS — M25.78 DEGENERATION OF INTERVERTEBRAL DISC OF CERVICAL REGION WITH OSTEOPHYTE OF CERVICAL VERTEBRA: ICD-10-CM

## 2024-01-18 PROCEDURE — 99213 OFFICE O/P EST LOW 20 MIN: CPT | Performed by: NEUROLOGICAL SURGERY

## 2024-01-18 PROCEDURE — 1125F AMNT PAIN NOTED PAIN PRSNT: CPT | Performed by: NEUROLOGICAL SURGERY

## 2024-01-18 PROCEDURE — 1036F TOBACCO NON-USER: CPT | Performed by: NEUROLOGICAL SURGERY

## 2024-01-18 ASSESSMENT — PATIENT HEALTH QUESTIONNAIRE - PHQ9
1. LITTLE INTEREST OR PLEASURE IN DOING THINGS: NOT AT ALL
SUM OF ALL RESPONSES TO PHQ9 QUESTIONS 1 AND 2: 0
2. FEELING DOWN, DEPRESSED OR HOPELESS: NOT AT ALL

## 2024-01-18 ASSESSMENT — PAIN SCALES - GENERAL: PAINLEVEL: 4

## 2024-01-18 ASSESSMENT — ENCOUNTER SYMPTOMS: OCCASIONAL FEELINGS OF UNSTEADINESS: 0

## 2024-01-18 NOTE — PROGRESS NOTES
[facility]  Department of Neurological Surgery  Established Patient Visit    History of Present Illness  Angelina Becerril is a 73 y.o. year old female who presents to the spine clinic in follow up with her daughter for checkup.  She is 6 months out from her decompressive laminectomy at L2-3 and L3-4.  This decompression is above her fusion from L4 to the sacrum.  The last time I saw her she was having a little bit of difficulty but she has been in therapy and she is making great progress.  She still cannot stand for uninterrupted hours of standing but her stamina is improving.  She is walking her dog daily.  She has not been as good about her daily exercises as I like her to be.  She is going to resume that program now.  She is concerned about her neck and we talked about that over the years.  I think that we can follow that expectantly.  She is going to have her carpal tunnels taken care of.  And then we can check a set of plain x-rays when she comes back to see me in July.      Patient's BMI is Body mass index is 28.25 kg/m².    14/14 systems reviewed and negative other than what is listed in the history of present illness    Patient Active Problem List   Diagnosis    Irritable bowel syndrome with diarrhea    Onychocryptosis    Onychomycosis    Paronychia of great toe of left foot    Pressure injury of toe of right foot, stage 2 (CMS/HCC)    Carpal tunnel syndrome    Cervical myelopathy (CMS/HCC)    Cervical spondylosis with radiculopathy    Degeneration of intervertebral disc of cervical region with osteophyte of cervical vertebra    History of lumbar spinal fusion    Imbalance    Spinal stenosis, lumbar region with neurogenic claudication    Numbness of anterior thigh    Spondylolisthesis, grade 1     Past Medical History:   Diagnosis Date    Anxiety disorder, unspecified     Anxiety    Personal history of diseases of the skin and subcutaneous tissue     History of psoriasis    Personal  history of other diseases of the circulatory system     History of hypertension    Personal history of other endocrine, nutritional and metabolic disease     History of diabetes mellitus    Personal history of other endocrine, nutritional and metabolic disease     History of hyperlipidemia     Past Surgical History:   Procedure Laterality Date    ANKLE SURGERY  11/24/2014    Ankle Surgery    BACK SURGERY  11/24/2014    Back Surgery    CHOLECYSTECTOMY  11/24/2014    Cholecystectomy    COLONOSCOPY  11/24/2014    Complete Colonoscopy    MANDIBLE SURGERY  11/24/2014    Jaw Surgery    OTHER SURGICAL HISTORY  11/24/2014    Treatment Of The Right Foot     Social History     Tobacco Use    Smoking status: Never    Smokeless tobacco: Never   Substance Use Topics    Alcohol use: Not Currently     family history includes Cirrhosis in her mother; Diabetes in her maternal grandmother.    Current Outpatient Medications:     atorvastatin (Lipitor) 20 mg tablet, Take 1 tablet (20 mg) by mouth once daily., Disp: , Rfl:     cholestyramine (Questran) 4 gram packet, Take 2 packets (8 g) by mouth once daily. Mix contents with 6 oz of noncarbonated beverage and swallow., Disp: , Rfl:     ciclopirox (Penlac) 8 % solution, USE AS DIRECTED., Disp: , Rfl:     clobetasol (Temovate) 0.05 % cream, Apply sparingly to affected area twice daily, Disp: , Rfl:     cyclobenzaprine (Flexeril) 5 mg tablet, Take 1-2 tablets (5-10 mg) by mouth 3 times a day as needed for muscle spasms (for back pain.)., Disp: , Rfl:     diazePAM (Valium) 5 mg tablet, TAKE 1 TABLET BY MOUTH 1 HOUR BEFORE MRI THEN MAY TAKE 1 TABLET RIGHT BEFORE ENTERING MRI AS NEEDED, Disp: , Rfl:     doxazosin (Cardura) 4 mg tablet, Take 1 tablet (4 mg) by mouth once daily., Disp: , Rfl:     gentamicin (Garamycin) 0.1 % cream, Apply once daily right foot ulcer, Disp: , Rfl:     LORazepam (Ativan) 0.5 mg tablet, Take 2 tablets (1 mg) by mouth 2 times a day as needed., Disp: , Rfl:      melatonin 5 mg tablet, Take 1 tablet (5 mg) by mouth once daily at bedtime., Disp: , Rfl:     metFORMIN (Glucophage) 1,000 mg tablet, , Disp: , Rfl:     metFORMIN (Glucophage) 500 mg tablet, Take 1 tablet (500 mg) by mouth 2 times a day with meals., Disp: , Rfl:     metoprolol succinate XL (Toprol-XL) 50 mg 24 hr tablet, Take 1 tablet (50 mg) by mouth once daily., Disp: , Rfl:     multivitamin with minerals iron-free (Centrum Silver), Take 1 tablet by mouth once daily., Disp: , Rfl:     nebivolol (Bystolic) 20 mg tablet, Bystolic 20 MG Oral Tablet  Quantity: 90  Refills: 0      Start : 31-Dec-2013  Active, Disp: , Rfl:     sertraline (Zoloft) 25 mg tablet, Take 1 tablet (25 mg) by mouth once daily. As directed., Disp: , Rfl:     sertraline (Zoloft) 50 mg tablet, Take 1 tablet (50 mg) by mouth once daily., Disp: , Rfl:     triamterene-hydrochlorothiazid (Maxzide-25) 37.5-25 mg tablet, Take 1 tablet by mouth once daily. As directed., Disp: , Rfl:   No Known Allergies    Physical Examination:    Her wound is well-healed.  She is walking without difficulty.  She turns well.  She had a negative Romberg.  She was able to toe and heel walk.    Results:  I personally reviewed and interpreted the imaging results which included plain x-rays from December show that the alignment has been steady.  She is not subluxing anywhere.  I want her to continue to work on her core strengthening    Assessment and Plan:      Angelina Becerril is a 73 y.o. year old female who presents to the spine clinic in follow up with no new complaints but steady progress on her core strength and stamina.  I am encouraging her to continue to do her daily exercises and she will come back and see us at her 1 year anniversary with a set of lumbar x-rays and we can get cervical x-rays at that time as well.      I have reviewed all prior documentation and reviewed the electronic medical record since admission. I have personally have reviewed all advanced imaging  not just the reports and used my interpretation as documented as the relevant findings. I have reviewed the risks and benefits of all treatment recommendations listed in this note with the patient and family. I spent a total of 25 minutes in service to this patient's care during this date of service.      The above clinical summary has been dictated with voice recognition software. It has not been proofread for grammatical errors, typographical mistakes, or other semantic inconsistencies.    Thank you for visiting our office today. It was our pleasure to take part in your healthcare.     Do not hesitate to call with any questions regarding your plan of care after leaving. My office can be reached at [phone] M-F 8am-4pm.     To clinicians, thank you very much for this kind referral. It is a privilege to partner with you in the care of your patients. My office would be delighted to assist you with any further consultations or with questions regarding the plan of care outlined. Do not hesitate to call the office or contact me directly.     Sincerely,    Terry Moran MD, FAANS, FACS  Board Certified Neurological Surgeon  , Department of Neurological Surgery  [facility] [employer] [facility]    [facility]  [address]  [address] [facility] [address]  [place] [address]    [facility]  [address]  [address]  [place] [address]    Phone: [phone]  Fax: [phone]

## 2024-01-19 ENCOUNTER — APPOINTMENT (OUTPATIENT)
Dept: RADIOLOGY | Facility: CLINIC | Age: 74
End: 2024-01-19
Payer: MEDICARE

## 2024-01-24 ENCOUNTER — HOSPITAL ENCOUNTER (OUTPATIENT)
Dept: RADIOLOGY | Facility: CLINIC | Age: 74
Discharge: HOME | End: 2024-01-24
Payer: MEDICARE

## 2024-01-24 DIAGNOSIS — M81.0 AGE-RELATED OSTEOPOROSIS WITHOUT CURRENT PATHOLOGICAL FRACTURE: ICD-10-CM

## 2024-01-24 PROCEDURE — 77080 DXA BONE DENSITY AXIAL: CPT | Performed by: RADIOLOGY

## 2024-01-24 PROCEDURE — 77080 DXA BONE DENSITY AXIAL: CPT

## 2024-02-15 ENCOUNTER — HOSPITAL ENCOUNTER (OUTPATIENT)
Facility: HOSPITAL | Age: 74
Setting detail: OUTPATIENT SURGERY
Discharge: HOME | End: 2024-02-15
Attending: ORTHOPAEDIC SURGERY | Admitting: ORTHOPAEDIC SURGERY
Payer: MEDICARE

## 2024-02-15 VITALS
DIASTOLIC BLOOD PRESSURE: 65 MMHG | HEART RATE: 61 BPM | SYSTOLIC BLOOD PRESSURE: 128 MMHG | TEMPERATURE: 98.4 F | RESPIRATION RATE: 18 BRPM | OXYGEN SATURATION: 96 %

## 2024-02-15 LAB — GLUCOSE BLD MANUAL STRIP-MCNC: 146 MG/DL (ref 74–99)

## 2024-02-15 PROCEDURE — 3600000008 HC OR TIME - EACH INCREMENTAL 1 MINUTE - PROCEDURE LEVEL THREE: Performed by: ORTHOPAEDIC SURGERY

## 2024-02-15 PROCEDURE — 7100000010 HC PHASE TWO TIME - EACH INCREMENTAL 1 MINUTE: Performed by: ORTHOPAEDIC SURGERY

## 2024-02-15 PROCEDURE — 2500000005 HC RX 250 GENERAL PHARMACY W/O HCPCS: Performed by: ORTHOPAEDIC SURGERY

## 2024-02-15 PROCEDURE — 3600000003 HC OR TIME - INITIAL BASE CHARGE - PROCEDURE LEVEL THREE: Performed by: ORTHOPAEDIC SURGERY

## 2024-02-15 PROCEDURE — 82947 ASSAY GLUCOSE BLOOD QUANT: CPT

## 2024-02-15 PROCEDURE — 7100000009 HC PHASE TWO TIME - INITIAL BASE CHARGE: Performed by: ORTHOPAEDIC SURGERY

## 2024-02-15 PROCEDURE — 2500000004 HC RX 250 GENERAL PHARMACY W/ HCPCS (ALT 636 FOR OP/ED): Performed by: ORTHOPAEDIC SURGERY

## 2024-02-15 PROCEDURE — 2720000007 HC OR 272 NO HCPCS: Performed by: ORTHOPAEDIC SURGERY

## 2024-02-15 RX ADMIN — SODIUM CHLORIDE, POTASSIUM CHLORIDE, SODIUM LACTATE AND CALCIUM CHLORIDE 500 ML: 600; 310; 30; 20 INJECTION, SOLUTION INTRAVENOUS at 09:15

## 2024-02-15 ASSESSMENT — ENCOUNTER SYMPTOMS
GASTROINTESTINAL NEGATIVE: 1
ARTHRALGIAS: 1
PSYCHIATRIC NEGATIVE: 1
EYES NEGATIVE: 1
NEUROLOGICAL NEGATIVE: 1
CONSTITUTIONAL NEGATIVE: 1
CARDIOVASCULAR NEGATIVE: 1
RESPIRATORY NEGATIVE: 1
JOINT SWELLING: 1

## 2024-02-15 ASSESSMENT — COLUMBIA-SUICIDE SEVERITY RATING SCALE - C-SSRS
6. HAVE YOU EVER DONE ANYTHING, STARTED TO DO ANYTHING, OR PREPARED TO DO ANYTHING TO END YOUR LIFE?: NO
1. IN THE PAST MONTH, HAVE YOU WISHED YOU WERE DEAD OR WISHED YOU COULD GO TO SLEEP AND NOT WAKE UP?: NO
2. HAVE YOU ACTUALLY HAD ANY THOUGHTS OF KILLING YOURSELF?: NO

## 2024-02-15 ASSESSMENT — PAIN SCALES - GENERAL: PAINLEVEL_OUTOF10: 0 - NO PAIN

## 2024-02-15 ASSESSMENT — PAIN - FUNCTIONAL ASSESSMENT: PAIN_FUNCTIONAL_ASSESSMENT: 0-10

## 2024-02-15 NOTE — OP NOTE
"RIGHT CARPAL TUNNEL RELEASE (R) Operative Note     Date: 2/15/2024  OR Location: [facility] OR    Name: Angelina Becerril \"\", : 1950, Age: 73 y.o., MRN: [id number], Sex: female    Diagnosis  Pre-op Diagnosis     * Carpal tunnel syndrome on right [G56.01] Post-op Diagnosis     * Carpal tunnel syndrome on right [G56.01]     Procedures  RIGHT CARPAL TUNNEL RELEASE  61432 - NV NEUROPLASTY &/TRANSPOS MEDIAN NRV CARPAL TUNNE      Surgeons      * Slim Alcantar - Primary    Resident/Fellow/Other Assistant:  Surgeon(s) and Role:    Procedure Summary  Anesthesia: Local  ASA: ASA status not filed in the log.  Anesthesia Staff: No anesthesia staff entered.  Estimated Blood Loss: 1mL  Intra-op Medications: Administrations occurring from 0830 to 0930 on 02/15/24:  * No intraprocedure medications in log *      Intraprocedure I/O Totals       None           Specimen: No specimens collected     Staff:   Circulator: Makenna Barr RN  Scrub Person: García Gerber         Drains and/or Catheters: * None in log *    Tourniquet Times:         Implants:     Findings: Intact median nerve    Indications: Mekhi Becerril is an 73 y.o. female who is having surgery for RIGHT CARPAL TUNNEL SYNDROME.  The patient failed conservative treatment and wishes to proceed with surgery.    The patient was seen in the preoperative area. The risks, benefits, complications, treatment options, non-operative alternatives, expected recovery and outcomes were discussed with the patient. The possibilities of reaction to medication, pulmonary aspiration, injury to surrounding structures, bleeding, recurrent infection, the need for additional procedures, failure to diagnose a condition, and creating a complication requiring transfusion or operation were discussed with the patient. The patient concurred with the proposed plan, giving informed consent.  The site of surgery was properly noted/marked if necessary per policy. The patient has been actively warmed in " preoperative area. Preoperative antibiotics are not indicated. Venous thrombosis prophylaxis are not indicated.    Procedure Details: The patient was seen in the preanesthesia area where the operative site was marked and the consent reviewed.  Then under sterile conditions the surgical site was infiltrated with a total of 10 cc of a 10 1 ratio of 1% lidocaine with epinephrine and 8.4% bicarbonate.  Patient was then taken back to the surgical suite.  The right upper extremity was placed on a hand table.  The right upper extremity was then prepped and draped in sterile fashion.  Once the draping is completed a surgical pause was performed reviewing the patient's name, indicate procedure and correct operative site.  Next using a 15 blade scalpel a 2.5 cm incision was made centered over the carpal tunnel along the radial border the ring finger.  The skin was incised sharply down to the subcutaneous tissues.  Then under loupe magnification the palmar fascia was divided with pickup and scissors.  The transverse carpal ligament was identified and incised with 15 blade scalpel.  A [place] elevator was placed underneath the ligament protecting the underlying nerve.  The most distal and proximal extents of the release were done under direct visualization with pickup and scissors.  When complete the nerve was released from the level of the superficial arch to approximately 1 cm proximal to the wrist flexion crease.  The underlying nerve is grossly intact.  The wound was copiously irrigated.  Hemostasis was obtained with bipolar cautery.  The skin was then closed with 5-0 Monocryl in buried fashion.  The extremity was cleaned and dried.  The incision was covered with skin glue.  Then a dry sterile dressing including Adaptic, 4 x 4's, sterile Webril and Ace bandage was applied.  The drapes were removed.  The patient was then taken to the PACU in stable condition.  The patient tolerated the procedure well.  There were no  complications.  Counts at the end of the case were correct.  Complications:  None; patient tolerated the procedure well.    Disposition: PACU - hemodynamically stable.  Condition: stable         Additional Details:     Attending Attestation: I was present and scrubbed for the entire procedure.    Slim Alcantar  Phone Number: [phone]

## 2024-02-15 NOTE — H&P
History Of Present Illness  Mekhi Becerril is a 73 y.o. female presenting with right carpal tunnel syndrome.     Past Medical History  Past Medical History:   Diagnosis Date    Anxiety disorder, unspecified     Anxiety    Personal history of diseases of the skin and subcutaneous tissue     History of psoriasis    Personal history of other diseases of the circulatory system     History of hypertension    Personal history of other endocrine, nutritional and metabolic disease     History of diabetes mellitus    Personal history of other endocrine, nutritional and metabolic disease     History of hyperlipidemia   Patient had recent diagnosis of Baker's cyst by her primary, patient states her  passed away and is now taking sertraline.     Surgical History  Past Surgical History:   Procedure Laterality Date    ANKLE SURGERY  11/24/2014    Ankle Surgery    BACK SURGERY  11/24/2014    Back Surgery    CHOLECYSTECTOMY  11/24/2014    Cholecystectomy    COLONOSCOPY  11/24/2014    Complete Colonoscopy    MANDIBLE SURGERY  11/24/2014    Jaw Surgery    OTHER SURGICAL HISTORY  11/24/2014    Treatment Of The Right Foot        Social History  She reports that she has never smoked. She has never used smokeless tobacco. She reports that she does not currently use alcohol. She reports that she does not use drugs.  Patient reports she lives at home with her puppy  has passed.  Family History  Family History   Problem Relation Name Age of Onset    Cirrhosis Mother      Diabetes Maternal Grandmother       Scheduled medications    Continuous medications    PRN medications    Allergies  Patient has no known allergies.    Review of Systems   Constitutional: Negative.    HENT: Negative.     Eyes: Negative.    Respiratory: Negative.     Cardiovascular: Negative.    Gastrointestinal: Negative.    Genitourinary: Negative.    Musculoskeletal:  Positive for arthralgias and joint swelling.        [place]'s cyst and knee   Neurological:  Negative.    Psychiatric/Behavioral: Negative.          Physical Exam  Constitutional:       Appearance: Normal appearance.   HENT:      Mouth/Throat:      Mouth: Mucous membranes are moist.      Pharynx: Oropharynx is clear.   Cardiovascular:      Rate and Rhythm: Normal rate and regular rhythm.   Pulmonary:      Breath sounds: Normal breath sounds.   Abdominal:      General: Bowel sounds are normal.      Palpations: Abdomen is soft.      Tenderness: There is no abdominal tenderness. There is no guarding.   Skin:     General: Skin is warm.      Capillary Refill: Capillary refill takes less than 2 seconds.   Neurological:      Mental Status: She is alert.          Last Recorded Vitals  There were no vitals taken for this visit.    Relevant Results        Patient has right carpal tunnel syndrome     Assessment/Plan   Active Problems:  There are no active Hospital Problems.      Right carpal tunnel syndrome  Plan:  -Plan for right carpal tunnel release with Dr. Alcantar under local anesthesia       I spent 10 minutes in the professional and overall care of this patient.      Miguel Angel Gonzalez PA-C

## 2024-02-15 NOTE — PERIOPERATIVE NURSING NOTE
Pt was dressed, walking to elevator, she became dizzy, felt like she was fainting. Pt sat down in chair, BP 74/31, HR 56. P. Ox 95%.  Skin warm and dry, denies nausea. Pt returned to cart. IV inserted Ltantecubital, LR bolus initiated.   0912- accu check 146  Pt states she feels better. Dr. Alcantar updated

## 2024-02-15 NOTE — PERIOPERATIVE NURSING NOTE
Dr. Fregoso in to see pt. Pt feels better. Assisted up to ambulate around unit, pt states she feels good, no dizziness

## 2024-02-16 ASSESSMENT — PAIN SCALES - GENERAL: PAINLEVEL_OUTOF10: 1

## 2024-03-06 NOTE — CONSULTS
Service:   Service: General Internal Medicine     Consult:  Consult requested by (Attending Name): Terry Moran   Reason: postop medical management     History of Present Illness:   HPI:    SIENNA MALCOLM is a 73 year old Female with a history of lumbar spinal stenosis status post fusion over 15 years ago.  She presents today for an elective lumbar laminectomy.  The surgery was uncomplicated see operative report for further details.  Her only other history is irritable bowel syndrome which is relatively well controlled.  She does have type 2 diabetes and is only on metformin.  She was seen postop eating dinner with no complaints whatsoever.  The pain is tolerable and is only present when she is moving.  She was told she can get up and move out of bed this evening and should be try and ambulate is much as possible.  She is wearing oxygen for reasons that are unclear.  We will check labs in the morning.  Medicine consulted for medical management.  Reviewed the order rec done by the surgical YING and no changes were made.    Medical history: As above  Surgical history: As above  Family history: Hypertension  Social history:  passed away 8 years ago, has 4 grandchildren, lives in the area, used to work at [employer] retired 3 years ago    A complete 10 point review of systems was completed and is otherwise negative unless stated.     Review Family/Social History and ROS:   Social History:    Smoking Status: never smoker  (1)            Allergies:  ·  No Known Allergies :     Objective:   Physical Exam Narrative:  ·  Physical Exam:    Physical Exam:   General appearance: no distress, well-appearing   HEENT: Atraumatic/normocephalic, moist mucosa  Neck: no obvious deformity, JVP WNL  Respiratory: good air movement, appropriate respiratory effort, no wheezing or crackles  Cardiovascular: regular rate, regular rhythm, no peripheral edema  Abdomen: no organomegaly, no tenderness to palpation in all  quadrants  Extremities: strong peripheral pulses, no grossly obvious deformities   Skin: intact, no rashes   Neurologic: Alert and oriented x 3, No obvious focal deficit  Psych: appropriate mood & affect, cooperative       Assessment:    73-year-old here for elective lumbar laminectomy.    #Postop day 0, lumbar laminectomy  #History of lumbar and cervical spinal stenosis  Management per neurosurgical team  Analgesia per neurosurgical team  Educated on postop alarm signs and when to contact medical team  Given family history, wonder if she has some HLA gene mutation    #Hypertension  #Type 2 diabetes  #Anxiety and depression  Continue home meds  Watch sugars, sliding scale if sugars become out of control    Diet:reg  Fluids:prn  DVT ppx: enox  Dispo:pending  Code Status: full    This is a preliminary note, please await attending attestation for final assessment and plan.    Mahad Fleming, DO PGY-3. Please contact me on [facility] with any additional questions, comments, or concerns.    This note was entered with the assistance of voice recognition software. Please excuse any grammatical or syntactic errors, I attempted to correct them to the best of my ability.      Consult Status:  Consult Order ID: [id number]     Attestation:   Note Completion:  I am a:  Resident/Fellow   Attending Attestation I saw and evaluated the patient.  I personally obtained the key and critical portions of the history and physical exam or was physically present for key and  critical portions performed by the resident/fellow. I reviewed the resident/fellow?s documentation and discussed the patient with the resident/fellow.  I agree with the resident/fellow?s medical decision making as documented in the note.     I personally evaluated the patient on 18-Jul-2023   Comments/ Additional Findings    I interviewed and examined patient with resident team.  Spinal stenosis s/p lumbar decompressive laminectomy. Doing well from medical perspective.   "        Electronic Signatures:  Mahad Fleming (DO (Resident))  (Signed 17-Jul-2023 17:43)   Authored: Service, History of Present Illness, Review  Family/Social History and ROS, Allergies, Objective, Assessment/Recommendations, Note Completion  Clarence Lynn)  (Signed 19-Jul-2023 18:46)   Authored: Note Completion   Co-Signer: Service, History of Present Illness, Review Family/Social History and ROS, Allergies, Objective, Assessment/Recommendations,  Note Completion      Last Updated: 19-Jul-2023 18:46 by Clarence Lynn)    References:  1.  Data Referenced From \"Patient Profile - Adult v2\" 17-Jul-2023 15:01   "

## 2024-03-21 ENCOUNTER — EVALUATION (OUTPATIENT)
Dept: PHYSICAL THERAPY | Facility: CLINIC | Age: 74
End: 2024-03-21
Payer: MEDICARE

## 2024-03-21 VITALS — OXYGEN SATURATION: 98 % | HEART RATE: 71 BPM

## 2024-03-21 DIAGNOSIS — M17.9 OA (OSTEOARTHRITIS) OF KNEE: ICD-10-CM

## 2024-03-21 PROCEDURE — 97530 THERAPEUTIC ACTIVITIES: CPT | Mod: GP

## 2024-03-21 PROCEDURE — 97161 PT EVAL LOW COMPLEX 20 MIN: CPT | Mod: GP

## 2024-03-21 ASSESSMENT — ENCOUNTER SYMPTOMS
LOSS OF SENSATION IN FEET: 1
DEPRESSION: 0
OCCASIONAL FEELINGS OF UNSTEADINESS: 1

## 2024-03-21 ASSESSMENT — PAIN - FUNCTIONAL ASSESSMENT: PAIN_FUNCTIONAL_ASSESSMENT: 0-10

## 2024-03-21 ASSESSMENT — COLUMBIA-SUICIDE SEVERITY RATING SCALE - C-SSRS
1. IN THE PAST MONTH, HAVE YOU WISHED YOU WERE DEAD OR WISHED YOU COULD GO TO SLEEP AND NOT WAKE UP?: NO
2. HAVE YOU ACTUALLY HAD ANY THOUGHTS OF KILLING YOURSELF?: NO
6. HAVE YOU EVER DONE ANYTHING, STARTED TO DO ANYTHING, OR PREPARED TO DO ANYTHING TO END YOUR LIFE?: NO

## 2024-03-21 ASSESSMENT — ACTIVITIES OF DAILY LIVING (ADL): ADL_ASSISTANCE: INDEPENDENT

## 2024-03-21 ASSESSMENT — PATIENT HEALTH QUESTIONNAIRE - PHQ9
SUM OF ALL RESPONSES TO PHQ9 QUESTIONS 1 AND 2: 0
2. FEELING DOWN, DEPRESSED OR HOPELESS: NOT AT ALL
1. LITTLE INTEREST OR PLEASURE IN DOING THINGS: NOT AT ALL

## 2024-03-21 ASSESSMENT — PAIN SCALES - GENERAL: PAINLEVEL_OUTOF10: 6

## 2024-03-21 ASSESSMENT — PAIN DESCRIPTION - DESCRIPTORS: DESCRIPTORS: OTHER (COMMENT)

## 2024-03-21 NOTE — PROGRESS NOTES
"Physical Therapy    Physical Therapy Evaluation     Patient Name: Angelina Becerril \"Jan\"  MRN: [id number]  Today's Date: 3/21/2024  Time Calculation  Start Time: 1150  Stop Time: 1221  Time Calculation (min): 31 min  Billing:  Evaluation:   Evaluation:  (Low):  15  Treatment:   Therapeutic Activity:  16    Insurance:   Visit #: 1  Insurance Reviewed (per information provided by  pre-cert team)  Authorization required:  Yes, [place]  Approved # of visits:  Authorization date range:      Assessment:  PT Assessment  PT Assessment Results: Decreased strength, Impaired balance, Decreased mobility, Pain  Rehab Prognosis: Fair   73yr F pt presents to physical therapy with complaints of R Knee pain - pt stated she has degenerative R torn meniscus. Diagnosis is R Knee OA. During examination patient has decreased strength, balance, gait/stair dysfunction, functional mobility, and tolerance with activity. pt would benefit from skilled physical therapy to maximize pt safety, increase tolerance to activity and to address impairments to restore PLOF with ADLs, functional mobility and participation in activities.      Plan:  OP PT Plan  Treatment/Interventions: Cryotherapy, Education/ Instruction, Gait training, Manual therapy, Neuromuscular re-education, Self care/ home management, Therapeutic activities, Therapeutic exercises, Biofeedback  PT Plan: Skilled PT  PT Frequency: Other (Comment) (1-2/week for 6 weeks)  Onset Date: 03/14/24  Rehab Potential: Fair  Plan of Care Agreement: Patient  NV: Start on nustep, quad/HS strengthening in open chain or off weighted to decrease R knee pain.     Current Problem:   1. OA (osteoarthritis) of knee  Referral to Physical Therapy    Follow Up In Physical Therapy          Subjective    General:  General  Reason for Referral: R Knee Pain  Referred By: MD Katharine  Past Medical History Relevant to Rehab: DM, HTN, OA, OP, Sleep Apnea; Spinal Stenosis, s/p laminectomty July 2023  Preferred Learning " Style: visual, verbal, written  General Comment: Degenerative torn meniscus - receievd a cortisone shot and felt good for about 3 days. Has been taking tylenol (2x/every 4 hours and voltarin gel) States she was in [place] and had decreased levels of pain but then came back and had increased levels of pain again. Dr. Alcantar does not want to do an MRI. Needed a wheelchair in [facility] in [place] instead of walking the concourse.     Precautions:  Precautions  STEADI Fall Risk Score (The score of 4 or more indicates an increased risk of falling): 6  Medical Precautions: No known precautions/limitation  Precautions Comment: based on stedi assessment considered at an increased risk for falls  Vital Signs:  Vital Signs  Heart Rate: 71  Heart Rate Source: Monitor  SpO2: 98 %  Patient Position: Sitting  Pain:  Pain Assessment  Pain Assessment: 0-10  Pain Score: 6  Pain Type: Acute pain  Pain Location: Knee  Pain Orientation: Right  Pain Descriptors: Other (Comment) (Knawing tooth ache)  Pain Frequency: Constant/continuous  Clinical Progression: Not changed  Effect of Pain on Daily Activities: Still able to complete activities just with increased levels pain.  Patient's Stated Pain Goal: Other (Comment) (Bareable pain)  Pain Interventions: Cold applied, Medication (See MAR)  Response to Interventions: Does not completely take away the pain  Home Living:  Home Living  Home Living Comment: 1st floor set up; no steps. laundry in unit. walk-in shower with grab bars  Prior Level of Function:  Prior Function Per Pt/Caregiver Report  Level of [place]: Independent with ADLs and functional transfers, Independent with homemaking with ambulation  Receives Help From: Family  ADL Assistance: Independent  Homemaking Assistance: Independent  Ambulatory Assistance: Independent  Vocational: Retired  Hand Dominance: Left    Objective     General Assessments:  Posture Comment: Fwd head, rounded shoulders  LE Dermatomes: WFL   Stairs: down is worse  than up; hurts with knee flexion     Functional Assessments:  Stairs Comment: Ascends/Descends 4 6in steps with a reciprocal pattern use of HR, decreased jeff, mild antalgia   Bed Mobility Comment: IND sup.sit and sit/sup  Transfers Comment: IND STS  Gait: Ambulates with BL knee flexion, decreased foot clearance, circumduction of R LE, decreased R Knee flexion, decreased heel strike and push off on the R LE, increased Fwd flexion; mild antalgia     Extremity/Trunk Assessments:  Strength RLE  R Hip Flexion: 4+/5  R Hip ABduction: 4+/5  R Hip ADduction: 4+/5  R Knee Flexion: 4+/5  R Knee Extension: 4+/5  R Ankle Dorsiflexion: 5/5  R Ankle Plantar Flexion: 5/5  Strength LLE  L Hip Flexion: 5/5  L Hip ABduction: 4+/5  L Hip ADduction: 4+/5  L Knee Flexion: 5/5  L Knee Extension: 5/5  L Ankle Dorsiflexion: 5/5  L Ankle Plantar Flexion: 5/5    KNEE  Observation  Observation Comment: Mod swelling when compared to the L knee  Knee Palpation/Joint Mobility  Palpation/Joint Mobility Comment: No pain with joint line palpation to the R knee; Pain with flexion and extension at the anterior and posterior knee  Knee AROM  R knee flexion: (140°): 130 (Pain at end range)  L knee flexion: (140°): 138  R knee extension: (0°): 0  L knee extension: (0°): 0  Knee PROM  R knee flexion: (140°): 140 (pain at end range with OP)  L knee flexion: (140°): 143  R knee extension: (0°): 0  L knee extension: (0°): 0  Flexibility  R hamstrings: WFL  L hamstrings: Mild tightness    Outcome Measures:  Other Measures  Lower Extremity Funtional Score (LEFS): 37/80   5xSTS = 16.92s started with no UE assist and then progressed wit UE assist on thighs BL   TUG = 9.75s, antalgic gait; mild unsteady, no device   SLS R: 3s  SLS L: 2s    Treatments:  Therapeutic Activity  Therapeutic Activity Performed: Yes  Therapeutic Activity 1: Low Complex Eval Assessment  1. Repeated sit to/from stands from standardized chair height. Required VC for no use of hands,  nose over toes, full upright stand from chair, use of anterior shift with fwd momentum and eccentric control into chair.  2. Functional Performance via gait analysis of TUG: Verbal cues for sequencing/positioning. 2x10' at SBA.   3. Functional mobility via AROM/PROM of LE; Verbal cues for sequencing/positioning.  4. Functional Performance via MMT of LE: Hip, knee, ankle; Verbal cues for sequencing/positioning.  5. Educated pt on POC, goals of physical therapy, purpose of physical therapy, as well as the benefits in participating in physical therapy to increase functional mobility and maximize pt safety.      EDUCATION:  Outpatient Education  Individual(s) Educated: Patient  Education Provided: Anatomy, Body Mechanics, Fall Risk, Home Safety, POC, Posture  Risk and Benefits Discussed with Patient/Caregiver/Other: yes  Patient/Caregiver Demonstrated Understanding: yes  Plan of Care Discussed and Agreed Upon: yes  Patient Response to Education: Patient/Caregiver Verbalized Understanding of Information, Patient/Caregiver Performed Return Demonstration of Exercises/Activities, Patient/Caregiver Asked Appropriate Questions    Goals:  STG: pt will be independent in HEP & symptom management    LTGs:  Pain: pt will demonstrate a 2 pt improvement for knee pain on the VAS scale, allowing for improved tolerance to perform daily functional activities.     ROM: pt will demonstrate no losses in knee AROM without onset of pain, allowing for a normal gait pattern.     Strength: Pt will improve B LE Strength to 5/5 to increase functional performance, tolerance to activity, and enhancing pt safety to particpate in ADLs and IADLs.    Gait: pt will demonstrate normal mechanics without pain during gait and performance of stairs, allowing for pt to return to navigating the community.     LEFS: pt will improve LEFS score by at least 9 points (minimal clinically important difference) in order to reflect increased ease in completing  ADL's/IADL's.  Baseline 37/80    TUG: pt will complete TUG within 12 seconds or less (indicative of higher fall risk) in order to INC balance and enhance safety during ADLs/ IADLs. (> or equal to 12 seconds indicates high risk for falls in older adults. 11-20 seconds is normal for the frail and elderly.) OR MCID 3.4s Baseline 9.75:  sec    5xSTS: pt will perform 5x sit to  < 15 seconds to decrease fall risk. OR MCID 2.3s Baseline 16.92  sec    Stairs: pt will ascend/descend step over step (6in step) with proper gait mechanics and use of <1HR to promote functional mobility and improve functional performance.

## 2024-03-28 ENCOUNTER — TREATMENT (OUTPATIENT)
Dept: PHYSICAL THERAPY | Facility: CLINIC | Age: 74
End: 2024-03-28
Payer: MEDICARE

## 2024-03-28 DIAGNOSIS — M17.9 OA (OSTEOARTHRITIS) OF KNEE: ICD-10-CM

## 2024-03-28 PROCEDURE — 97110 THERAPEUTIC EXERCISES: CPT | Mod: GP,CQ

## 2024-03-28 ASSESSMENT — PAIN SCALES - GENERAL: PAINLEVEL_OUTOF10: 7

## 2024-03-28 ASSESSMENT — PAIN - FUNCTIONAL ASSESSMENT: PAIN_FUNCTIONAL_ASSESSMENT: 0-10

## 2024-03-28 NOTE — PROGRESS NOTES
Physical Therapy    Physical Therapy Treatment    Patient Name: Angelina Becerril  MRN: [id number]  Today's Date: 3/28/2024  Time Calculation  Start Time: 0845  Stop Time: 0930  Time Calculation (min): 45 min    Insurance:   Visit #: 2  Insurance Reviewed (per information provided by  pre-cert team)  Authorization required:  [place], [place]  Approved # of visits:  9  Authorization date range:  3/21/24 to 5/19/24    Assessment:   Patient tolerated initial exercises to increase LE flexibility, mobility, and strength.  T/c for resisted multi-directional hip exercises at posterior pelvis and anterior shoulders to avoid compensation via hip motion in opposing direction.  Able to display correct technique and adequate control upon cueing to isolate target musculature, specific to the goal of each exercise.  Issued HEP.  Positive response post session.     Plan:   OP PT Plan  Treatment/Interventions: Cryotherapy, Education/ Instruction, Gait training, Manual therapy, Neuromuscular re-education, Self care/ home management, Therapeutic activities, Therapeutic exercises, Biofeedback  PT Plan: Skilled PT  PT Frequency: Other (Comment) (1-2/week for 6 weeks)  Onset Date: 03/14/24  Rehab Potential: Fair  Plan of Care Agreement: Patient      Current Problem  1. OA (osteoarthritis) of knee  Follow Up In Physical Therapy          General  PT  Visit  PT Received On: 03/28/24  General  Reason for Referral: R Knee Pain  Referred By: MD Katharine  Past Medical History Relevant to Rehab: DM, HTN, OA, OP, Sleep Apnea; Spinal Stenosis, s/p laminectomty July 2023    Subjective    Patient reports she felt good when she woke up this morning, but then after about a half hour of moving each day, pain in R. Knee worsens and stays the same intensity throughout the day.      Precautions  STEADI Fall Risk Score (The score of 4 or more indicates an increased risk of falling): 6  Medical Precautions: No known precautions/limitation  Precautions Comment: based  "on stedi assessment considered at an increased risk for falls     Pain  Pain Assessment  Pain Assessment: 0-10  Pain Score: 7  Pain Type: Acute pain  Pain Location: Knee  Pain Orientation: Right    Objective   Function:  Unable to walk her dog secondary to pain in L. Knee     Treatments:  **= HEP, NP= Not Performed, NV= Next Visit  Therapeutic Exercise  Therapeutic Exercise Performed: Yes  Therapeutic Exercise Activity 1: Nustep L3 x 8' for ROM and low impact strength  Therapeutic Exercise Activity 2: Hamstring stair stretch 30\" x 2 ambar  Therapeutic Exercise Activity 3: Hip Flexor stair stretch 30\" x 2 ambar  Therapeutic Exercise Activity 4: RTB 3 Way Hip 2 x 10 ambar, each  Therapeutic Exercise Activity 5: mini squats 2 x 10  Therapeutic Exercise Activity 6: HR x 20  Therapeutic Exercise Activity 7: LAQ with isometric hip adduction via ball squeeze 2 x 10 ambar  Therapeutic Exercise Activity 8: QS 5\" hold x 15**  Therapeutic Exercise Activity 9: QS with SLR 2 x 10**  Therapeutic Exercise Activity 10: Heel slides 2 x10**  Therapeutic Exercise Activity 11: RTB hamstring curls 2 x 10 ambar    OP EDUCATION:   V/c for correct technique and posture with exercises.     HEP:  Access Code: [email or web address]  URL: [email or web address]  Date: 03/28/2024  Prepared by: Hattie Akbar    Exercises  - Supine Heel Slide  - 1 x daily - 7 x weekly - 2 sets - 10 reps  - Supine Quad Set on Towel Roll  - 1 x daily - 7 x weekly - 2 sets - 10 reps - 5 second hold  - Active Straight Leg Raise with Quad Set  - 1 x daily - 7 x weekly - 2 sets - 10 reps    "

## 2024-04-04 ENCOUNTER — HOSPITAL ENCOUNTER (OUTPATIENT)
Facility: HOSPITAL | Age: 74
Setting detail: OUTPATIENT SURGERY
Discharge: HOME | End: 2024-04-04
Attending: ORTHOPAEDIC SURGERY | Admitting: ORTHOPAEDIC SURGERY
Payer: MEDICARE

## 2024-04-04 VITALS
SYSTOLIC BLOOD PRESSURE: 132 MMHG | TEMPERATURE: 97.7 F | OXYGEN SATURATION: 96 % | BODY MASS INDEX: 28.13 KG/M2 | HEART RATE: 65 BPM | DIASTOLIC BLOOD PRESSURE: 63 MMHG | WEIGHT: 175.04 LBS | HEIGHT: 66 IN | RESPIRATION RATE: 16 BRPM

## 2024-04-04 PROCEDURE — 2500000004 HC RX 250 GENERAL PHARMACY W/ HCPCS (ALT 636 FOR OP/ED): Performed by: ORTHOPAEDIC SURGERY

## 2024-04-04 PROCEDURE — 7100000009 HC PHASE TWO TIME - INITIAL BASE CHARGE: Performed by: ORTHOPAEDIC SURGERY

## 2024-04-04 PROCEDURE — A4217 STERILE WATER/SALINE, 500 ML: HCPCS | Performed by: ORTHOPAEDIC SURGERY

## 2024-04-04 PROCEDURE — 2500000005 HC RX 250 GENERAL PHARMACY W/O HCPCS: Performed by: ORTHOPAEDIC SURGERY

## 2024-04-04 PROCEDURE — 3600000003 HC OR TIME - INITIAL BASE CHARGE - PROCEDURE LEVEL THREE: Performed by: ORTHOPAEDIC SURGERY

## 2024-04-04 PROCEDURE — 3600000008 HC OR TIME - EACH INCREMENTAL 1 MINUTE - PROCEDURE LEVEL THREE: Performed by: ORTHOPAEDIC SURGERY

## 2024-04-04 PROCEDURE — 2720000007 HC OR 272 NO HCPCS: Performed by: ORTHOPAEDIC SURGERY

## 2024-04-04 PROCEDURE — 7100000010 HC PHASE TWO TIME - EACH INCREMENTAL 1 MINUTE: Performed by: ORTHOPAEDIC SURGERY

## 2024-04-04 RX ORDER — INDOMETHACIN 25 MG/1
CAPSULE ORAL AS NEEDED
Status: DISCONTINUED | OUTPATIENT
Start: 2024-04-04 | End: 2024-04-04 | Stop reason: HOSPADM

## 2024-04-04 RX ORDER — SODIUM CHLORIDE 0.9 G/100ML
IRRIGANT IRRIGATION AS NEEDED
Status: DISCONTINUED | OUTPATIENT
Start: 2024-04-04 | End: 2024-04-04 | Stop reason: HOSPADM

## 2024-04-04 ASSESSMENT — PAIN - FUNCTIONAL ASSESSMENT
PAIN_FUNCTIONAL_ASSESSMENT: 0-10

## 2024-04-04 ASSESSMENT — PAIN SCALES - GENERAL
PAINLEVEL_OUTOF10: 0 - NO PAIN
PAINLEVEL_OUTOF10: 0 - NO PAIN

## 2024-04-04 NOTE — H&P
History Of Present Illness  Mekhi Becerril is a 74 y.o. female with HTN, presenting with left carpal tunnel syndrome. She states her symptoms have been going on for over a year. She has tried conservative measures such as bracing at night, but states that the symptoms have not improved. She already got a carpal tunnel release on the right hand after EMG confirmed bilateral carpal tunnel syndrome. Today she presents for a carpal tunnel release to be performed on her right hand. She has no complaints or concerns at this time. Denies any recent colds, fevers, or infections.      Past Medical History  Past Medical History:   Diagnosis Date    Anxiety disorder, unspecified     Anxiety    Personal history of diseases of the skin and subcutaneous tissue     History of psoriasis    Personal history of other diseases of the circulatory system     History of hypertension    Personal history of other endocrine, nutritional and metabolic disease     History of diabetes mellitus    Personal history of other endocrine, nutritional and metabolic disease     History of hyperlipidemia       Surgical History  Past Surgical History:   Procedure Laterality Date    ANKLE SURGERY  11/24/2014    Ankle Surgery    BACK SURGERY  11/24/2014    Back Surgery    CHOLECYSTECTOMY  11/24/2014    Cholecystectomy    COLONOSCOPY  11/24/2014    Complete Colonoscopy    MANDIBLE SURGERY  11/24/2014    Jaw Surgery    OTHER SURGICAL HISTORY  11/24/2014    Treatment Of The Right Foot        Social History  She reports that she has never smoked. She has never used smokeless tobacco. She reports that she does not currently use alcohol. She reports that she does not use drugs.    Family History  Family History   Problem Relation Name Age of Onset    Cirrhosis Mother      Diabetes Maternal Grandmother          Allergies  Patient has no known allergies.    Review of Systems   Musculoskeletal:         Numbness and tingling in the left hand.   All other systems reviewed  "and are negative.       Physical Exam  Constitutional:       Appearance: Normal appearance.   HENT:      Head: Normocephalic.   Cardiovascular:      Rate and Rhythm: Normal rate and regular rhythm.      Pulses: Normal pulses.      Heart sounds: Normal heart sounds.      Comments: Radial pulse left hand 2+  Pulmonary:      Effort: Pulmonary effort is normal.      Breath sounds: Normal breath sounds.   Abdominal:      General: Abdomen is flat. Bowel sounds are normal.      Tenderness: There is no abdominal tenderness.   Skin:     General: Skin is warm.      Capillary Refill: Capillary refill takes less than 2 seconds.   Neurological:      General: No focal deficit present.      Mental Status: She is alert and oriented to person, place, and time.   Psychiatric:         Mood and Affect: Mood normal.         Behavior: Behavior normal.          Last Recorded Vitals  Blood pressure 152/67, pulse 72, temperature 36.7 °C (98.1 °F), temperature source Temporal, resp. rate 16, height 1.676 m (5' 6\"), weight 79.4 kg (175 lb 0.7 oz), SpO2 98 %.    Relevant Results           Assessment/Plan   Active Problems:  There are no active Hospital Problems.      Patient's next best option after failed conservative measures is to get an open carpel tunnel release under local anesthesia. Patient is aware of the risks and benefits of the procedure. She has no further concerns and agrees to proceed.        I spent 25 minutes in the professional and overall care of this patient.      Kaity Munguia PA-C    "

## 2024-04-04 NOTE — OP NOTE
"LEFT CARPAL TUNNEL RELEASE WITH MEDIAN NERVE DECOMPRESSION (L) Operative Note     Date: 2024  OR Location: [facility]    Name: Angelina Becerril \"\", : 1950, Age: 74 y.o., MRN: [id number], Sex: female    Diagnosis  Pre-op Diagnosis     * Carpal tunnel syndrome, left [G56.02] Post-op Diagnosis     * Carpal tunnel syndrome, left [G56.02]     Procedures  LEFT CARPAL TUNNEL RELEASE WITH MEDIAN NERVE DECOMPRESSION  68891 - MO NEUROPLASTY &/TRANSPOS MEDIAN NRV CARPAL TUNNE      Surgeons      * Slim Alcantar - Primary    Resident/Fellow/Other Assistant:  Surgeon(s) and Role:    Procedure Summary  Anesthesia: Local  ASA: ASA status not filed in the log.  Anesthesia Staff: No anesthesia staff entered.  Estimated Blood Loss: 1 mL  Intra-op Medications:   Administrations occurring from 0830 to 0930 on 24:   Medication Name Total Dose   lidocaine-epinephrine PF (Xylocaine W/EPI) 1 %-1:200,000 injection 9 mL   sodium bicarbonate 1 mEq/mL (8.4 %) injection 1 mEq   sodium chloride 0.9 % irrigation solution 1,000 mL         Intraprocedure I/O Totals       None           Specimen: No specimens collected     Staff:   Circulator: Catina Ott RN; Catherine Arreola RN  Scrub Person: García Gerber         Drains and/or Catheters: * None in log *    Tourniquet Times:         Implants:     Findings: Intact median nerve    Indications: Mekhi Becerril is an 74 y.o. female who is having surgery for LEFT CARPAL TUNNEL SYNDROME.     The patient was seen in the preoperative area. The risks, benefits, complications, treatment options, non-operative alternatives, expected recovery and outcomes were discussed with the patient. The possibilities of reaction to medication, pulmonary aspiration, injury to surrounding structures, bleeding, recurrent infection, the need for additional procedures, failure to diagnose a condition, and creating a complication requiring transfusion or operation were discussed with the patient. The patient " concurred with the proposed plan, giving informed consent.  The site of surgery was properly noted/marked if necessary per policy. The patient has been actively warmed in preoperative area. Preoperative antibiotics are not indicated. Venous thrombosis prophylaxis are not indicated.    Procedure Details: The patient was seen in the preanesthesia area where the operative site was marked and the consent reviewed.  Then under sterile conditions the surgical site was infiltrated with a total of 10 cc of a 10 1 ratio of 1% lidocaine with epinephrine and 8.4% bicarbonate.  The patient was then taken back to the surgical suite.  The left upper extremities placed on a hand table.  The left upper extremity was then prepped and draped sterile fashion.  Once the draping is completed a surgical pause was performed reviewing patient's name, indicated procedure and correct operative site.  Next using a 15 blade scalpel a 2.5 cm incision was made over the carpal tunnel along the radial border the ring finger.  The skin was incised sharply down to the subcutaneous tissues.  Then under loupe magnification the subcutaneous tissues were divided with pickup and scissors.  The transverse carpal ligament was then identified and incised with 15 blade scalpel.  A Vivian elevator was placed underneath the ligament protecting the underlying nerve.  The most distal and proximal extents of the release were done under direct visualization with pickup and scissors.  When complete the nerve was released from the level of the superficial arch to approximately 1 cm proximal to the wrist flexion crease.  The underlying nerve is grossly intact.  The wound was copiously irrigated.  Hemostasis was obtained with bipolar cautery.  The skin was then closed with 5-0 Monocryl in buried fashion.  The extremity was cleaned and dried.  The incision was covered with LiquiBand skin glue.  Then a dry sterile dressing including Adaptic, 4 x 4's, sterile Webril and  an Ace bandage was applied.  The drapes were removed.  The patient was then taken to the PACU in stable condition  Complications:  None; patient tolerated the procedure well.    Disposition: PACU - hemodynamically stable.  Condition: stable         Additional Details:     Attending Attestation: I was present and scrubbed for the entire procedure.    Slim Alcantar  Phone Number: [phone]

## 2024-04-05 ENCOUNTER — TREATMENT (OUTPATIENT)
Dept: PHYSICAL THERAPY | Facility: CLINIC | Age: 74
End: 2024-04-05
Payer: MEDICARE

## 2024-04-05 DIAGNOSIS — M17.9 OA (OSTEOARTHRITIS) OF KNEE: ICD-10-CM

## 2024-04-05 PROCEDURE — 97110 THERAPEUTIC EXERCISES: CPT | Mod: GP

## 2024-04-05 ASSESSMENT — PAIN SCALES - GENERAL: PAINLEVEL_OUTOF10: 2

## 2024-04-05 ASSESSMENT — PAIN - FUNCTIONAL ASSESSMENT: PAIN_FUNCTIONAL_ASSESSMENT: 0-10

## 2024-04-05 NOTE — PROGRESS NOTES
"Physical Therapy Treatment    Patient Name: Angelina Becerril \"Jan\"  MRN: [id number]  Today's Date: 4/5/2024  Time Calculation  Start Time: 1100  Stop Time: 1138  Time Calculation (min): 38 min  Billing:  Treatment:   Therapeutic Exercise:  38  NMR:       Insurance:   Visit #: 3  Insurance Reviewed (per information provided by  pre-cert team)  Authorization required:  Yes, [place]  Approved # of visits:  9  Authorization date range:  3/21/24 to 5/19/24    Assessment:     pt tolerated treatment well, did well with progression of strengthening exercises and did not have any increased pain during the session. pt needs continued skilled PT to work on knee strength to allow return to optimal level of functional mobility and tolerance with activities. Patient is progressing with goals. pt left session with all questions answered.     Plan:  OP PT Plan  Treatment/Interventions: Cryotherapy, Education/ Instruction, Gait training, Manual therapy, Neuromuscular re-education, Self care/ home management, Therapeutic activities, Therapeutic exercises, Biofeedback  PT Plan: Skilled PT  Onset Date: 03/14/24  NV: Pt has to leave early for her MD appt at 1145; possible step downs    Current Problem  Problem List Items Addressed This Visit             ICD-10-CM    OA (osteoarthritis) of knee M17.9       General  PT  Visit  PT Received On: 04/05/24  Response to Previous Treatment: Patient with no complaints from previous session., Compliant with home exercise program  General  Reason for Referral: R Knee Pain  Referred By: MD Katharine  Past Medical History Relevant to Rehab: DM, HTN, OA, OP, Sleep Apnea; Spinal Stenosis, s/p laminectomty July 2023  Preferred Learning Style: visual, verbal, written  General Comment: Knee feels better; she is continuing to go down with the bad and up with the good; states one day this week she did not take any tylenol or aleve. No falls reported.    Subjective    Precautions  Precautions  STEADI Fall Risk " "Score (The score of 4 or more indicates an increased risk of falling): 6  Medical Precautions: No known precautions/limitation  Precautions Comment: based on stedi assessment considered at an increased risk for falls  Pain  Pain Assessment  Pain Assessment: 0-10  Pain Score: 2  Pain Type: Acute pain  Pain Location: Knee  Pain Orientation: Right  Pain Descriptors:  (a little pull)    Objective   Posture  Postural Control  Posture Comment: Fwd head, rounded shoulders, increased thoracic kyphosis; decreased lumbar lordosis with sitting unsupported    Gait: The patient is ambulating with the following device: no device. Gait deviations include: Mildly antalgic.     Treatments:  Therapeutic Exercise  Therapeutic Exercise Performed: Yes  Therapeutic Exercise Activity 1: Nustep L4 x 8' for ROM and low impact strength  Therapeutic Exercise Activity 2: Hamstring stair stretch 30\" x 2 ambar  Therapeutic Exercise Activity 3: Hip Flexor stair stretch 30\" x 2 ambar  Therapeutic Exercise Activity 4: RTB 3 Way Hip 2 x 10 ambar, each  Therapeutic Exercise Activity 5: mini squats 2 x 10  Therapeutic Exercise Activity 6: HR x 20  Therapeutic Exercise Activity 7: LAQ with isometric hip adduction via ball squeeze 2 x 10 ambar, 2# AW  Therapeutic Exercise Activity 8: SLR 2# AW BL 2x10  Therapeutic Exercise Activity 9: 6in step: R knee drives 2x15    OP EDUCATION:  Outpatient Education  Individual(s) Educated: Patient  Education Provided: Anatomy, Body Mechanics, Fall Risk, Home Safety, POC, Posture  Patient/Caregiver Demonstrated Understanding: yes  Patient Response to Education: Patient/Caregiver Verbalized Understanding of Information, Patient/Caregiver Performed Return Demonstration of Exercises/Activities, Patient/Caregiver Asked Appropriate Questions  "

## 2024-04-09 ENCOUNTER — TREATMENT (OUTPATIENT)
Dept: PHYSICAL THERAPY | Facility: CLINIC | Age: 74
End: 2024-04-09
Payer: MEDICARE

## 2024-04-09 DIAGNOSIS — M17.9 OA (OSTEOARTHRITIS) OF KNEE: ICD-10-CM

## 2024-04-09 PROCEDURE — 97110 THERAPEUTIC EXERCISES: CPT | Mod: GP

## 2024-04-09 ASSESSMENT — PAIN SCALES - GENERAL: PAINLEVEL_OUTOF10: 4

## 2024-04-09 ASSESSMENT — PAIN DESCRIPTION - DESCRIPTORS: DESCRIPTORS: OTHER (COMMENT)

## 2024-04-09 ASSESSMENT — PAIN - FUNCTIONAL ASSESSMENT: PAIN_FUNCTIONAL_ASSESSMENT: 0-10

## 2024-04-09 NOTE — PROGRESS NOTES
Physical Therapy Treatment    Patient Name: Angelina Becerril  MRN: [id number]  Today's Date: 4/9/2024  Time Calculation  Start Time: 1046  Stop Time: 1116  Time Calculation (min): 30 min  Billing:  Treatment:   Therapeutic Exercise:  30    Insurance:   Visit #: 4  Insurance Reviewed (per information provided by  pre-cert team)  Authorization required:  Yes, [place]  Approved # of visits:  9  Authorization date range:  3/21/24 to 5/19/24    Assessment:  PT Assessment  PT Assessment Results: Decreased strength, Impaired balance, Decreased mobility, Pain  Rehab Prognosis: Fair  Evaluation/Treatment Tolerance: Patient tolerated treatment well  Pt stated she has to leave her appt early due to having a doctors appointment right after therapy. She tolerated treatment abbreviated well, did well with progression of step downs from 6in box although a visible challenge. pt needs continued skilled PT to work on MEDINA stremgth to allow return to optimal level of functional mobility and tolerance with activities. Patient is progressing with goals. pt left session with all questions answered.     Plan:  OP PT Plan  Treatment/Interventions: Cryotherapy, Education/ Instruction, Gait training, Manual therapy, Neuromuscular re-education, Self care/ home management, Therapeutic activities, Therapeutic exercises, Biofeedback  PT Plan: Skilled PT  Onset Date: 03/14/24  NV: Print out HEP, Quantum Leg Machine?     Current Problem  Problem List Items Addressed This Visit             ICD-10-CM    OA (osteoarthritis) of knee M17.9       General  PT  Visit  PT Received On: 04/09/24  Response to Previous Treatment: Patient with no complaints from previous session., Compliant with home exercise program  General  Reason for Referral: R Knee Pain  Referred By: MD Katharine  Past Medical History Relevant to Rehab: DM, HTN, OA, OP, Sleep Apnea; Spinal Stenosis, s/p laminectomty July 2023  Preferred Learning Style: visual, verbal, written  General Comment:  "Reports she still thinks the knee is getting beter but still is reporting pain at the knee, no falls. HEP is being performed consistently    Subjective    Precautions  Precautions  STEADI Fall Risk Score (The score of 4 or more indicates an increased risk of falling): 6  Medical Precautions: No known precautions/limitation  Precautions Comment: based on stedi assessment considered at an increased risk for falls  Pain  Pain Assessment  Pain Assessment: 0-10  Pain Score: 4  Pain Type: Acute pain  Pain Location: Knee  Pain Orientation: Right  Pain Descriptors: Other (Comment) (Unstable)    Objective   Posture  Postural Control  Posture Comment: Fwd head, rounded shoulders, increased thoracic kyphosis; decreased lumbar lordosis with sitting unsupported    L UE Wrist: wrapped in ace wrap and gauze s/p CTS surgery     Treatments:  Therapeutic Exercise  Therapeutic Exercise Performed: Yes  Therapeutic Exercise Activity 1: Nustep L4 x 8' for ROM and low impact strength  Therapeutic Exercise Activity 2: Hamstring stair stretch 30\" x 2 ambar  Therapeutic Exercise Activity 3: Hip Flexor stair stretch 30\" x 2 ambar  Therapeutic Exercise Activity 5: mini squats 2 x 10  Therapeutic Exercise Activity 6: HR x 20  Therapeutic Exercise Activity 7: LAQ with isometric hip adduction via ball squeeze 2 x 15 ambar, 2# AW  Therapeutic Exercise Activity 9: 6in step: R knee drives 2x15 BL  Therapeutic Exercise Activity 10: Step Downs with 6in box; at // bar BL 2x10; UE support with holding on // bar  Therapeutic Exercise Activity 12: Standing 3 Way hip with 2# AW 2x10 BL    OP EDUCATION:  Outpatient Education  Individual(s) Educated: Patient  Education Provided: Anatomy, Body Mechanics, Fall Risk, Home Safety, Posture  Patient/Caregiver Demonstrated Understanding: yes  Patient Response to Education: Patient/Caregiver Verbalized Understanding of Information, Patient/Caregiver Performed Return Demonstration of Exercises/Activities, Patient/Caregiver " Asked Appropriate Questions  Access Code: [email or web address]  URL: [email or web address]  Date: 04/09/2024  Prepared by: Delaney Torres    Exercises  - Supine Heel Slide  - 1 x daily - 7 x weekly - 2 sets - 10 reps  - Supine Quad Set on Towel Roll  - 1 x daily - 7 x weekly - 2 sets - 10 reps - 5 second hold  - Active Straight Leg Raise with Quad Set  - 1 x daily - 7 x weekly - 2 sets - 10 reps  - Standing Hip Extension with Anchored Resistance  - 1 x daily - 7 x weekly - 3 sets - 10 reps  - Standing Hip Abduction with Anchored Resistance  - 1 x daily - 7 x weekly - 3 sets - 10 reps  - Standing Repeated Hip Flexion with Resistance  - 1 x daily - 7 x weekly - 3 sets - 10 reps  - Standing Hamstring Stretch with Step  - 1 x daily - 7 x weekly - 3 sets - 30s hold  - Standing Knee Flexion Stretch on Step  - 1 x daily - 7 x weekly - 3 sets - 30s hold

## 2024-04-11 ENCOUNTER — TREATMENT (OUTPATIENT)
Dept: PHYSICAL THERAPY | Facility: CLINIC | Age: 74
End: 2024-04-11
Payer: MEDICARE

## 2024-04-11 DIAGNOSIS — M17.9 OA (OSTEOARTHRITIS) OF KNEE: ICD-10-CM

## 2024-04-11 PROCEDURE — 97110 THERAPEUTIC EXERCISES: CPT | Mod: GP

## 2024-04-11 ASSESSMENT — PAIN SCALES - GENERAL: PAINLEVEL_OUTOF10: 4

## 2024-04-11 ASSESSMENT — PAIN - FUNCTIONAL ASSESSMENT: PAIN_FUNCTIONAL_ASSESSMENT: 0-10

## 2024-04-11 ASSESSMENT — PAIN DESCRIPTION - DESCRIPTORS: DESCRIPTORS: OTHER (COMMENT)

## 2024-04-11 NOTE — PROGRESS NOTES
Physical Therapy Treatment    Patient Name: Angelina Becerril  MRN: [id number]  Today's Date: 4/11/2024  Time Calculation  Start Time: 1130  Stop Time: 1208  Time Calculation (min): 38 min  Billing:  Treatment:   Therapeutic Exercise:  38    Insurance:   Visit #: 5  Insurance Reviewed (per information provided by  pre-cert team)  Authorization required:  Yes, [place]  Approved # of visits:  9  Authorization date range:  3/21/24 to 5/19/24    Assessment:  PT Assessment  PT Assessment Results: Decreased strength, Impaired balance, Decreased mobility, Pain  Rehab Prognosis: Fair  Evaluation/Treatment Tolerance: Patient tolerated treatment well  pt tolerated treatment well, had difficulty with step ups and step downs today. Had Poor control with R SL decline step. pt needs continued skilled PT to work on knee strengthening and mobility to allow return to optimal level of functional mobility and tolerance with activities. Patient is progressing with goals. pt left session with all questions answered.     Plan:  OP PT Plan  Treatment/Interventions: Cryotherapy, Education/ Instruction, Gait training, Manual therapy, Neuromuscular re-education, Self care/ home management, Therapeutic activities, Therapeutic exercises, Biofeedback  PT Plan: Skilled PT  Onset Date: 03/14/24  NV: Slider Drills    Current Problem  Problem List Items Addressed This Visit             ICD-10-CM    OA (osteoarthritis) of knee M17.9       General  PT  Visit  PT Received On: 04/11/24  Response to Previous Treatment: Patient with no complaints from previous session., Compliant with home exercise program  General  Reason for Referral: R Knee Pain  Referred By: MD Katharine  Past Medical History Relevant to Rehab: DM, HTN, OA, OP, Sleep Apnea; Spinal Stenosis, s/p laminectomty July 2023  Preferred Learning Style: visual, verbal, written  General Comment: Reports that she is trying to get a gel injection for her R knee but needs approval first. No  "falls    Subjective    Precautions  Precautions  STEADI Fall Risk Score (The score of 4 or more indicates an increased risk of falling): 6  Medical Precautions: No known precautions/limitation  Precautions Comment: based on stedi assessment considered at an increased risk for falls.  Pain  Pain Assessment  Pain Assessment: 0-10  Pain Score: 4  Pain Type: Acute pain  Pain Location: Knee  Pain Orientation: Right  Pain Descriptors: Other (Comment) (Reports it feels like a twinge)    Objective   Posture  Postural Control  Posture Comment: Fwd head, rounded shoulders, increased thoracic kyphosis; decreased lumbar lordosis with sitting unsupported    Gait: The patient is ambulating with the following device: no device. Gait deviations include: Mildly antalgic, Decreased velocity, Decreased stride length, and trunk lean.     Treatments:  Therapeutic Exercise  Therapeutic Exercise Performed: Yes  Therapeutic Exercise Activity 1: Nustep L3 x 8' for ROM and low impact strength  Therapeutic Exercise Activity 2: Hamstring stair stretch 30\" x 2 ambar  Therapeutic Exercise Activity 3: Hip Flexor stair stretch 30\" x 2 ambar  Therapeutic Exercise Activity 4: SLR with 1# AW BL 3x10  Therapeutic Exercise Activity 5: mini squats 2 x 10  Therapeutic Exercise Activity 6: HR x 20  Therapeutic Exercise Activity 7: Quantum Machine 10# R LE only 3x10 LAQ  Therapeutic Exercise Activity 8: Quantum Machine 20# R LE only 3x10 HS Curl  Therapeutic Exercise Activity 9: 6in step: R knee drives 2x10 BL  Therapeutic Exercise Activity 10: Step Downs with 6in box; at // bar BL x10; UE support with holding on // bar  Therapeutic Exercise Activity 11: Hip Circles CW/CCW X10 BL  Therapeutic Exercise Activity 12: Clamshells BL x15  Therapeutic Exercise Activity 13: 3 Way Hip BL x10 RTB; holding onto // bar for support    OP EDUCATION:  Outpatient Education  Individual(s) Educated: Patient  Education Provided: Anatomy, Body Mechanics, Fall Risk, Home Safety, " Posture  Patient/Caregiver Demonstrated Understanding: yes  Patient Response to Education: Patient/Caregiver Verbalized Understanding of Information, Patient/Caregiver Performed Return Demonstration of Exercises/Activities, Patient/Caregiver Asked Appropriate Questions

## 2024-04-15 ENCOUNTER — TREATMENT (OUTPATIENT)
Dept: PHYSICAL THERAPY | Facility: CLINIC | Age: 74
End: 2024-04-15
Payer: MEDICARE

## 2024-04-15 DIAGNOSIS — M17.9 OA (OSTEOARTHRITIS) OF KNEE: ICD-10-CM

## 2024-04-15 PROCEDURE — 97110 THERAPEUTIC EXERCISES: CPT | Mod: GP

## 2024-04-15 ASSESSMENT — PAIN SCALES - GENERAL: PAINLEVEL_OUTOF10: 0 - NO PAIN

## 2024-04-15 ASSESSMENT — PAIN - FUNCTIONAL ASSESSMENT: PAIN_FUNCTIONAL_ASSESSMENT: 0-10

## 2024-04-15 NOTE — PROGRESS NOTES
Physical Therapy Treatment    Patient Name: Angelina Becerril  MRN: [id number]  Today's Date: 4/15/2024  Time Calculation  Start Time: 0130  Stop Time: 0210  Time Calculation (min): 40 min  Billing:  Treatment:   Therapeutic Exercise:  38  NMR:  2    Insurance:   Visit #: 6/9  Insurance Reviewed (per information provided by  pre-cert team)  Authorization required:  Yes, [place]  Approved # of visits:  9  Authorization date range:  3/21/24 to 5/19/24    Assessment:  PT Assessment  PT Assessment Results: Decreased strength, Pain, Decreased mobility  Rehab Prognosis: Fair  Evaluation/Treatment Tolerance: Patient tolerated treatment well  pt tolerated treatment well, did well with progression of strengthening in the knee exercises. Had difficulty with comprehending how to properly complete a step down and required mod vc and tactile cues to complete. pt needs continued skilled PT to work on knee control and stability to allow return to optimal level of functional mobility and tolerance with activities. pt left session with all questions answered.     Plan:  OP PT Plan  Treatment/Interventions: Cryotherapy, Education/ Instruction, Gait training, Manual therapy, Neuromuscular re-education, Self care/ home management, Therapeutic activities, Therapeutic exercises, Biofeedback  PT Plan: Skilled PT  Onset Date: 03/14/24  NV: Slider Drills, Blaze Pods     Current Problem  Problem List Items Addressed This Visit             ICD-10-CM    OA (osteoarthritis) of knee M17.9       General  PT  Visit  PT Received On: 04/15/24  Response to Previous Treatment: Patient with no complaints from previous session., Compliant with home exercise program  General  Reason for Referral: R Knee Pain  Referred By: MD Katharine  Past Medical History Relevant to Rehab: DM, HTN, OA, OP, Sleep Apnea; Spinal Stenosis, s/p laminectomty July 2023  Preferred Learning Style: visual, verbal, written  General Comment: Reports she was able to get out and do shoe  "shopping with her grand-daughter. Knee has been feeling okay. No falls reported.    Subjective    Precautions  Precautions  STEADI Fall Risk Score (The score of 4 or more indicates an increased risk of falling): 6  Medical Precautions: No known precautions/limitation  Precautions Comment: based on stedi assessment considered at an increased risk for falls.  Pain  Pain Assessment  Pain Assessment: 0-10  Pain Score: 0 - No pain  Pain Type: Acute pain  Pain Location: Knee  Pain Orientation: Right    Objective   Posture  Postural Control  Posture Comment: Fwd head, rounded shoulders, increased thoracic kyphosis; decreased lumbar lordosis with sitting unsupported    Gait: The patient is ambulating with the following device: no device. Gait deviations include: Mildly antalgic, Lacks heel strike, Decreased velocity, Forward flexed, and trunk lean with gait and circumduction.     Treatments:  Therapeutic Exercise  Therapeutic Exercise Performed: Yes  Therapeutic Exercise Activity 1: Nustep L3 x 8' for ROM and low impact strength  Therapeutic Exercise Activity 2: Hamstring stair stretch 30\" x 2 ambar  Therapeutic Exercise Activity 3: Hip Flexor stair stretch 30\" x 2 ambar  Therapeutic Exercise Activity 4: SLR with 2# AW BL 3x10  Therapeutic Exercise Activity 7: Quantum Machine 10# R LE only 3x10 LAQ  Therapeutic Exercise Activity 8: Quantum Machine 25# R LE only 2x10, 1x5 HS Curl  Therapeutic Exercise Activity 9: 6in step: R knee drives 2x10 BL  Therapeutic Exercise Activity 10: Step Downs with 6in box; at // bar BL x10; UE support with holding on // bar  Therapeutic Exercise Activity 11: Hip Circles CW/CCW X10 BL with 2# AW    Balance/Neuromuscular Re-Education  Balance/Neuromuscular Re-Education Activity 1: Slider Drills 2x10 BL    OP EDUCATION:  Outpatient Education  Individual(s) Educated: Patient  Education Provided: Anatomy, Body Mechanics, Fall Risk, Home Safety, Posture  Patient/Caregiver Demonstrated Understanding: " yes  Patient Response to Education: Patient/Caregiver Verbalized Understanding of Information, Patient/Caregiver Performed Return Demonstration of Exercises/Activities, Patient/Caregiver Asked Appropriate Questions

## 2024-04-18 ENCOUNTER — TREATMENT (OUTPATIENT)
Dept: PHYSICAL THERAPY | Facility: CLINIC | Age: 74
End: 2024-04-18
Payer: MEDICARE

## 2024-04-18 DIAGNOSIS — M17.9 OA (OSTEOARTHRITIS) OF KNEE: ICD-10-CM

## 2024-04-18 PROCEDURE — 97110 THERAPEUTIC EXERCISES: CPT | Mod: GP

## 2024-04-18 PROCEDURE — 97112 NEUROMUSCULAR REEDUCATION: CPT | Mod: GP

## 2024-04-18 ASSESSMENT — PAIN SCALES - GENERAL: PAINLEVEL_OUTOF10: 3

## 2024-04-18 ASSESSMENT — PAIN DESCRIPTION - DESCRIPTORS: DESCRIPTORS: DULL

## 2024-04-18 ASSESSMENT — PAIN - FUNCTIONAL ASSESSMENT: PAIN_FUNCTIONAL_ASSESSMENT: 0-10

## 2024-04-18 NOTE — PROGRESS NOTES
Physical Therapy Treatment    Patient Name: Angelina Becerril  MRN: [id number]  Today's Date: 4/18/2024  Time Calculation  Start Time: 0956  Stop Time: 1035  Time Calculation (min): 39 min  Billing:  Treatment:   Therapeutic Exercise:  31  NMR:  8    Insurance:   Visit #: 7/9  Insurance Reviewed (per information provided by  pre-cert team)  Authorization required:  Yes, Aryan  Approved # of visits:  9  Authorization date range:  3/21/24 to 5/19/24    Assessment:  PT Assessment  PT Assessment Results: Decreased strength, Pain, Decreased mobility  Rehab Prognosis: Fair  Evaluation/Treatment Tolerance: Patient tolerated treatment well  pt tolerated treatment well. Had difficulty with maintaining balance with blaze pods and had 1 LOB requiring therapist to provide Mod A to help maintain balance and safety, pt utilized stepping strategy as well. pt needs continued skilled PT to work on dynamic knee stability/balance and strength to allow return to optimal level of functional mobility and tolerance with activities. Patient is progressing with goals of strength. pt left session with all questions answered.     Plan:  OP PT Plan  Treatment/Interventions: Cryotherapy, Education/ Instruction, Gait training, Manual therapy, Neuromuscular re-education, Self care/ home management, Therapeutic activities, Therapeutic exercises, Biofeedback  PT Plan: Skilled PT  Onset Date: 03/14/24  NV: hurdles with dynamic balance     Current Problem  Problem List Items Addressed This Visit             ICD-10-CM    OA (osteoarthritis) of knee M17.9       General  PT  Visit  PT Received On: 04/18/24  Response to Previous Treatment: Patient with no complaints from previous session., Compliant with home exercise program  General  Reason for Referral: R Knee Pain  Referred By: MD Katharine  Past Medical History Relevant to Rehab: DM, HTN, OA, OP, Sleep Apnea; Spinal Stenosis, s/p laminectomty July 2023  Preferred Learning Style: visual, verbal,  "written  General Comment: States her R knee was bothering her yesterday - took a tylenol/aleve and then it felt better. Reports she thinks she may have overdid the walking on tuesday (30min). Reports she is going to get a gel injection in her R knee. Felt fine after last session - was tired but felt good    Subjective    Precautions  Precautions  STEADI Fall Risk Score (The score of 4 or more indicates an increased risk of falling): 6  Medical Precautions: No known precautions/limitation  Precautions Comment: based on stedi assessment considered at an increased risk for falls.  Pain  Pain Assessment  Pain Assessment: 0-10  Pain Score: 3  Pain Type: Acute pain  Pain Location: Knee  Pain Orientation: Right  Pain Descriptors: Dull    Objective   Posture  Postural Control  Posture Comment: Fwd head, rounded shoulders, increased thoracic kyphosis; decreased lumbar lordosis with sitting unsupported  Extremity/Trunk Assessment  Gait: The patient is ambulating with the following device: no device. Gait deviations include: Moderately antalgic, Decreased velocity, Decreased stride length, and ER of R LE during entire gait cycle.     Blaze Pods: Stepping Exercise:   R: 18 --> 20  L: 21 --> 21     Treatments:  Therapeutic Exercise  Therapeutic Exercise Performed: Yes  Therapeutic Exercise Activity 1: Nustep L3 x 8' for ROM and low impact strength  Therapeutic Exercise Activity 2: Hamstring stair stretch 30\" x 2 ambar  Therapeutic Exercise Activity 3: Hip Flexor stair stretch 30\" x 2 ambar  Therapeutic Exercise Activity 5: mini squats 2 x 10 at mirror  Therapeutic Exercise Activity 6: Ball Squeeze with LAQ BL 2x10  Therapeutic Exercise Activity 7: Quantum Machine 10# R LE x15 LAQ --> x15 15#  Therapeutic Exercise Activity 8: Quantum Machine 25# R LE only 2x10, 1x5 HS Curl  Therapeutic Exercise Activity 9: 6in step: L knee drives x10 --> 2x10 8in step with // bar for support at [facility]    Balance/Neuromuscular " Re-Education  Balance/Neuromuscular Re-Education Activity 1: Slider Drills 2x10 BL  Balance/Neuromuscular Re-Education Activity 2: Blaze Pods: 4 pods, 8in step, 6in step, and 4in step; with 1 UE pole for assist. VC for proper sequencing/positioning 2x30s BL LE.    OP EDUCATION:  Outpatient Education  Individual(s) Educated: Patient  Education Provided: Anatomy, Body Mechanics, Fall Risk, Home Safety, Posture  Patient/Caregiver Demonstrated Understanding: yes  Patient Response to Education: Patient/Caregiver Verbalized Understanding of Information, Patient/Caregiver Performed Return Demonstration of Exercises/Activities, Patient/Caregiver Asked Appropriate Questions

## 2024-04-22 ENCOUNTER — TREATMENT (OUTPATIENT)
Dept: PHYSICAL THERAPY | Facility: CLINIC | Age: 74
End: 2024-04-22
Payer: MEDICARE

## 2024-04-22 DIAGNOSIS — M17.9 OA (OSTEOARTHRITIS) OF KNEE: ICD-10-CM

## 2024-04-22 PROCEDURE — 97110 THERAPEUTIC EXERCISES: CPT | Mod: GP

## 2024-04-22 PROCEDURE — 97112 NEUROMUSCULAR REEDUCATION: CPT | Mod: GP

## 2024-04-22 ASSESSMENT — PAIN SCALES - GENERAL: PAINLEVEL_OUTOF10: 1

## 2024-04-22 ASSESSMENT — PAIN DESCRIPTION - DESCRIPTORS: DESCRIPTORS: DULL

## 2024-04-22 ASSESSMENT — PAIN - FUNCTIONAL ASSESSMENT: PAIN_FUNCTIONAL_ASSESSMENT: 0-10

## 2024-04-22 NOTE — PROGRESS NOTES
Physical Therapy Treatment    Patient Name: Angelina Becerril  MRN: [id number]  Today's Date: 4/22/2024  Time Calculation  Start Time: 1101  Stop Time: 1140  Time Calculation (min): 39 min  Billing:  Treatment:   Therapeutic Exercise:  23  NMR:  16    Insurance:   Visit #: 8/9  Insurance Reviewed (per information provided by  pre-cert team)  Authorization required:  Yes, Carelon  Approved # of visits:  9  Authorization date range:  3/21/24 to 5/19/24    Assessment:  PT Assessment  PT Assessment Results: Decreased strength, Pain, Decreased mobility  Rehab Prognosis: Fair  Evaluation/Treatment Tolerance: Patient tolerated treatment well  pt tolerated treatment well, had difficulty with dynamic balance during blaze pod chapin/exercises needing VC for widening her MARGARITA. Patient is progressing with goals created at initial evaluation and is IND in HEP. Anticipate at DC at next visit unless status changes.  pt left session with all questions answered.     Plan:  OP PT Plan  Treatment/Interventions: Cryotherapy, Education/ Instruction, Gait training, Manual therapy, Neuromuscular re-education, Self care/ home management, Therapeutic activities, Therapeutic exercises, Biofeedback  PT Plan: Skilled PT  Onset Date: 03/14/24  NV: Re-Check/DC     Current Problem  Problem List Items Addressed This Visit             ICD-10-CM    OA (osteoarthritis) of knee M17.9       General  PT  Visit  PT Received On: 04/22/24  Response to Previous Treatment: Patient with no complaints from previous session., Compliant with home exercise program  General  Reason for Referral: R Knee Pain  Referred By: MD Katharine  Past Medical History Relevant to Rehab: DM, HTN, OA, OP, Sleep Apnea; Spinal Stenosis, s/p laminectomty July 2023  Preferred Learning Style: visual, verbal, written  General Comment: States she walked a lot this weekend - states her thighs hurt both of them (pulling sensation). No falls have been reported. Reports she is getting her  "injections on tuesday for her R knee.    Subjective    Precautions  Precautions  STEADI Fall Risk Score (The score of 4 or more indicates an increased risk of falling): 6  Medical Precautions: No known precautions/limitation  Precautions Comment: based on stedi assessment considered at an increased risk for falls.  Pain  Pain Assessment  Pain Assessment: 0-10  Pain Score: 1  Pain Type: Acute pain  Pain Location: Knee  Pain Orientation: Right  Pain Descriptors: Dull    Objective   Posture  Postural Control  Posture Comment: Fwd head, rounded shoulders, increased thoracic kyphosis; decreased lumbar lordosis with sitting unsupported    Gait: The patient is ambulating with the following device: no device. Gait deviations include: Decreased velocity and NBOS, at times will ambulate heel to opposite toe, no LOB during session.     Treatments:  Therapeutic Exercise  Therapeutic Exercise Performed: Yes  Therapeutic Exercise Activity 1: Nustep L3 x 8' for ROM and low impact strength  Therapeutic Exercise Activity 2: Hamstring stair stretch 30\" x 2 ambar  Therapeutic Exercise Activity 3: Hip Flexor stair stretch 30\" x 2 ambar  Therapeutic Exercise Activity 7: Quantum Machine 15# R LE 3x10 LAQ --  Therapeutic Exercise Activity 8: Quantum Machine 25# R LE only 3x10    Balance/Neuromuscular Re-Education  Balance/Neuromuscular Re-Education Activity 1: Slider Drills 3x10 BL with BL UE assist poles  Balance/Neuromuscular Re-Education Activity 2: Blaze Pods: 4 pods, 8in step, 6in step, and 4in step; with 1 UE pole for assist. VC for proper sequencing/positioning 2x30s BL LE.  Balance/Neuromuscular Re-Education Activity 3: Dynamic Balance with blze pods and 5 high hurdles: gait belt; CGA for balance; 4x60s with 60s breaks in btw each set    OP EDUCATION:  Outpatient Education  Individual(s) Educated: Patient  Education Provided: Anatomy, Body Mechanics, Fall Risk, Home Safety, Posture  Patient/Caregiver Demonstrated Understanding: " yes  Patient Response to Education: Patient/Caregiver Verbalized Understanding of Information, Patient/Caregiver Performed Return Demonstration of Exercises/Activities, Patient/Caregiver Asked Appropriate Questions

## 2024-04-24 ENCOUNTER — TREATMENT (OUTPATIENT)
Dept: PHYSICAL THERAPY | Facility: CLINIC | Age: 74
End: 2024-04-24
Payer: MEDICARE

## 2024-04-24 DIAGNOSIS — M17.9 OA (OSTEOARTHRITIS) OF KNEE: ICD-10-CM

## 2024-04-24 PROCEDURE — 97530 THERAPEUTIC ACTIVITIES: CPT | Mod: GP

## 2024-04-24 PROCEDURE — 97110 THERAPEUTIC EXERCISES: CPT | Mod: GP

## 2024-04-24 ASSESSMENT — PAIN DESCRIPTION - DESCRIPTORS: DESCRIPTORS: DULL

## 2024-04-24 ASSESSMENT — PAIN - FUNCTIONAL ASSESSMENT: PAIN_FUNCTIONAL_ASSESSMENT: 0-10

## 2024-04-24 ASSESSMENT — PAIN SCALES - GENERAL: PAINLEVEL_OUTOF10: 1

## 2024-04-24 NOTE — PROGRESS NOTES
Physical Therapy Re-Assessment & DC     Patient Name: Angelina Becerril  MRN: [id number]  Today's Date: 4/24/2024  Time Calculation  Start Time: 1046  Stop Time: 1124  Time Calculation (min): 38 min  Billing:  Treatment:   Therapeutic Exercise:  23  Therapeutic Activity:  15    Insurance:   Visit #: 9/9  Insurance Reviewed (per information provided by  pre-cert team)  Authorization required:  Yes, Aryan  Approved # of visits:  9  Authorization date range:  3/21/24 to 5/19/24    Assessment:     Pt presents to physical therapy as a re-evaluation. At this time pt continues to minimal demonstrate deficits in strength and balance but has improved greatly over her course of therapy. Since initial evaluation, pt has met majority therapeutic goals. pt is discharged from skilled PT due to being IND in [facility] and is functioning at baseline prior to symptoms in the knee. pt verbally agrees to DC at this time. pt left session with all questions answered.     Plan:  OP PT Plan  PT Plan: No Additional PT interventions required at this time  Onset Date: 03/14/24    Current Problem  Problem List Items Addressed This Visit             ICD-10-CM    OA (osteoarthritis) of knee M17.9       General  PT  Visit  PT Received On: 04/24/24  Response to Previous Treatment: Patient with no complaints from previous session., Compliant with home exercise program  General  Reason for Referral: R Knee Pain  Referred By: MD Katharine  Past Medical History Relevant to Rehab: DM, HTN, OA, OP, Sleep Apnea; Spinal Stenosis, s/p laminectomty July 2023  Preferred Learning Style: visual, verbal, written  General Comment: Saw Dr. Alcantar yesterday and recieved her gel injection in the R knee. Reports her knee does not feel much different. Last few mornings she has stated that she has forgotten abotu her R knee.    Subjective    Precautions  Precautions  STEADI Fall Risk Score (The score of 4 or more indicates an increased risk of falling): 6  Medical Precautions:  No known precautions/limitation  Precautions Comment: based on stedi assessment considered at an increased risk for falls.  Pain  Pain Assessment  Pain Assessment: 0-10  Pain Score: 1  Pain Type: Acute pain  Pain Location: Knee  Pain Orientation: Right  Pain Descriptors: Dull    Objective   General Assessments:  Posture Comment: Fwd head, rounded shoulders  LE Dermatomes: WFL   Stairs: down is worse than up; hurts with knee flexion      Functional Assessments:  Stairs Comment: Ascends/Descends 4 6in steps with a reciprocal pattern use of HR, fair jeff, no antalgia noted    Bed Mobility Comment: IND sup/sit and sit/sup    Transfers Comment: IND STS    Gait: Ambulates with BL knee flexion, NBOS, decreased foot clearance, circumduction of R LE, increased Fwd flexion     Extremity/Trunk Assessments:  Strength RLE  R Hip Flexion: 4+/5--> same   R Hip ABduction: 4+/5 -> same   R Hip ADduction: 4+/5 -> same   R Knee Flexion: 4+/5 --> 5/5  R Knee Extension: 4+/5 --> 5/5   R Ankle Dorsiflexion: 5/5  R Ankle Plantar Flexion: 5/5    Strength LLE  L Hip Flexion: 5/5 --> 4+/5  L Hip ABduction: 4+/5 -> same   L Hip ADduction: 4+/5 -> same   L Knee Flexion: 5/5  L Knee Extension: 5/5  L Ankle Dorsiflexion: 5/5  L Ankle Plantar Flexion: 5/5     KNEE  Observation  Observation Comment: Minimal swelling when compared to the L knee    Knee Palpation/Joint Mobility  Palpation/Joint Mobility Comment: No pain with joint line palpation to the R knee;NO Pain with flexion and extension at the anterior and posterior knee    Knee AROM  R knee flexion: (140°): 130 (Pain at end range) --> 142 no pain  L knee flexion: (140°): 138  R knee extension: (0°): 0  L knee extension: (0°): 0  Knee PROM  R knee flexion: (140°): 140 (pain at end range with OP)--> 145 minor pain noted at the knee at end range   L knee flexion: (140°): 143  R knee extension: (0°): 0  L knee extension: (0°): 0    Flexibility  R hamstrings: WFL  L hamstrings: Mild tightness  "--> WFL     Outcome Measures:  Other Measures  Lower Extremity Funtional Score (LEFS): 65%  5xSTS = 16.92s started with no UE assist and then progressed wit UE assist on thighs BL --> 12.03s no UE assist throughout     TUG = 9.75s, antalgic gait; mild unsteady, no device -> 8.90s, no antalgic gait, no device, steady     SLS R: 3s --> 2s  SLS L: 2s -> same     Treatments:  Therapeutic Exercise  Therapeutic Exercise Performed: Yes  Therapeutic Exercise Activity 1: Nustep L3 x 8' for ROM and low impact strength  Therapeutic Exercise Activity 2: Hamstring stair stretch 30\" x 2 ambar  Therapeutic Exercise Activity 3: Hip Flexor stair stretch 30\" x 2 ambar  Therapeutic Exercise Activity 7: Quantum Machine 20# R LE 3x10 LAQ  Therapeutic Exercise Activity 8: Quantum Machine 30# R LE only 3x10    Therapeutic Activity  Therapeutic Activity Performed: Yes  Therapeutic Activity 1: Re-Assessment/DC  1. Repeated sit to/from stands from standardized chair height. Required VC for no use of hands, nose over toes, full upright stand from chair, use of anterior shift with fwd momentum and eccentric control into chair.  2. Functional Performance via gait analysis of TUG: Verbal cues for sequencing/positioning. 2x10' at SBA.   3. Functional mobility via AROM/PROM of LE; Verbal cues for sequencing/positioning.  4. Functional Performance via MMT of LE: Hip, knee, ankle; Verbal cues for sequencing/positioning.  5. Educated pt on POC, goals of physical therapy, purpose of physical therapy, as well as the benefits in participating in physical therapy to increase functional mobility and maximize pt safety.      OP EDUCATION:  Outpatient Education  Individual(s) Educated: Patient  Education Provided: Anatomy, Body Mechanics, Fall Risk, Posture, Home Exercise Program, Home Safety, POC  Risk and Benefits Discussed with Patient/Caregiver/Other: yes  Patient/Caregiver Demonstrated Understanding: yes  Plan of Care Discussed and Agreed Upon: " yes  Patient Response to Education: Patient/Caregiver Verbalized Understanding of Information, Patient/Caregiver Performed Return Demonstration of Exercises/Activities, Patient/Caregiver Asked Appropriate Questions    Goals: Re-Check/DC 4/24/24  STG: pt will be independent in HEP & symptom management     LTGs:  Pain: pt will demonstrate a 2 pt improvement for knee pain on the VAS scale, allowing for improved tolerance to perform daily functional activities.  (ADEQUATE FOR DC )      ROM: pt will demonstrate no losses in knee AROM without onset of pain, allowing for a normal gait pattern. (MET)      Strength: Pt will improve B LE Strength to 5/5 to increase functional performance, tolerance to activity, and enhancing pt safety to particpate in ADLs and IADLs. (ADEQUATE FOR DC)      Gait: pt will demonstrate normal mechanics without pain during gait and performance of stairs, allowing for pt to return to navigating the community. (MET)      LEFS: pt will improve LEFS score by at least 9 points (minimal clinically important difference) in order to reflect increased ease in completing ADL's/IADL's.  Baseline 37/80 --> 52/80 (MET)      TUG: pt will complete TUG within 12 seconds or less (indicative of higher fall risk) in order to INC balance and enhance safety during ADLs/ IADLs. (> or equal to 12 seconds indicates high risk for falls in older adults. 11-20 seconds is normal for the frail and elderly.) OR MCID 3.4s Baseline 9.75:  sec --> 8.9s     5xSTS: pt will perform 5x sit to  < 15 seconds to decrease fall risk. OR MCID 2.3s Baseline 16.92  sec --> 12.03s      Stairs: pt will ascend/descend step over step (6in step) with proper gait mechanics and use of <1HR to promote functional mobility and improve functional performance. (MET)

## 2024-06-19 ENCOUNTER — HOSPITAL ENCOUNTER (OUTPATIENT)
Dept: RADIOLOGY | Facility: CLINIC | Age: 74
Discharge: HOME | End: 2024-06-19
Payer: MEDICARE

## 2024-06-19 DIAGNOSIS — M50.30 DEGENERATION OF INTERVERTEBRAL DISC OF CERVICAL REGION WITH OSTEOPHYTE OF CERVICAL VERTEBRA: ICD-10-CM

## 2024-06-19 DIAGNOSIS — M25.78 DEGENERATION OF INTERVERTEBRAL DISC OF CERVICAL REGION WITH OSTEOPHYTE OF CERVICAL VERTEBRA: ICD-10-CM

## 2024-06-19 DIAGNOSIS — M48.062 SPINAL STENOSIS, LUMBAR REGION WITH NEUROGENIC CLAUDICATION: ICD-10-CM

## 2024-06-19 PROCEDURE — 72052 X-RAY EXAM NECK SPINE 6/>VWS: CPT | Performed by: RADIOLOGY

## 2024-06-19 PROCEDURE — 72052 X-RAY EXAM NECK SPINE 6/>VWS: CPT

## 2024-06-19 PROCEDURE — 72120 X-RAY BEND ONLY L-S SPINE: CPT

## 2024-07-02 ENCOUNTER — APPOINTMENT (OUTPATIENT)
Dept: NEUROSURGERY | Facility: CLINIC | Age: 74
End: 2024-07-02
Payer: MEDICARE

## 2024-07-02 VITALS
BODY MASS INDEX: 27.68 KG/M2 | DIASTOLIC BLOOD PRESSURE: 74 MMHG | SYSTOLIC BLOOD PRESSURE: 138 MMHG | TEMPERATURE: 97.1 F | WEIGHT: 172.2 LBS | RESPIRATION RATE: 18 BRPM | HEIGHT: 66 IN | HEART RATE: 68 BPM

## 2024-07-02 DIAGNOSIS — M47.812 CERVICAL SPONDYLOSIS WITHOUT MYELOPATHY: Primary | ICD-10-CM

## 2024-07-02 DIAGNOSIS — Z98.1 STATUS POST LUMBAR SPINAL FUSION: ICD-10-CM

## 2024-07-02 PROCEDURE — 1036F TOBACCO NON-USER: CPT | Performed by: NEUROLOGICAL SURGERY

## 2024-07-02 PROCEDURE — 99214 OFFICE O/P EST MOD 30 MIN: CPT | Performed by: NEUROLOGICAL SURGERY

## 2024-07-02 PROCEDURE — 1125F AMNT PAIN NOTED PAIN PRSNT: CPT | Performed by: NEUROLOGICAL SURGERY

## 2024-07-02 PROCEDURE — 1159F MED LIST DOCD IN RCRD: CPT | Performed by: NEUROLOGICAL SURGERY

## 2024-07-02 ASSESSMENT — PATIENT HEALTH QUESTIONNAIRE - PHQ9
1. LITTLE INTEREST OR PLEASURE IN DOING THINGS: NOT AT ALL
2. FEELING DOWN, DEPRESSED OR HOPELESS: NOT AT ALL
2. FEELING DOWN, DEPRESSED OR HOPELESS: NOT AT ALL
SUM OF ALL RESPONSES TO PHQ9 QUESTIONS 1 AND 2: 0
SUM OF ALL RESPONSES TO PHQ9 QUESTIONS 1 AND 2: 0
1. LITTLE INTEREST OR PLEASURE IN DOING THINGS: NOT AT ALL

## 2024-07-02 ASSESSMENT — PAIN SCALES - GENERAL: PAINLEVEL: 1

## 2024-07-02 NOTE — PROGRESS NOTES
[facility] [place]  Department of Neurological Surgery  Established Patient Visit    History of Present Illness  Angelina Becerril is a 74 y.o. year old female who presents to the spine clinic in follow up with very few complaints today.  She recently had her carpal tunnels decompressed and she got rid of most of the tingling and numbness in her fingers and she is able to use her hands better.  She has occasional low back pain and occasional neck pain but for the most part she is doing quite well these days.  She is very pleased with the results of her carpal tunnel decompression.    Patient's BMI is Body mass index is 27.79 kg/m².    14/14 systems reviewed and negative other than what is listed in the history of present illness    Patient Active Problem List   Diagnosis    Irritable bowel syndrome with diarrhea    Onychocryptosis    Onychomycosis    Paronychia of great toe of left foot    Pressure injury of toe of right foot, stage 2 (Multi)    Carpal tunnel syndrome    Cervical myelopathy (Multi)    Cervical spondylosis without myelopathy    Degeneration of intervertebral disc of cervical region with osteophyte of cervical vertebra    Status post lumbar spinal fusion    Imbalance    Spinal stenosis, lumbar region with neurogenic claudication    Numbness of anterior thigh    Spondylolisthesis, grade 1    OA (osteoarthritis) of knee     Past Medical History:   Diagnosis Date    Anxiety disorder, unspecified     Anxiety    Personal history of diseases of the skin and subcutaneous tissue     History of psoriasis    Personal history of other diseases of the circulatory system     History of hypertension    Personal history of other endocrine, nutritional and metabolic disease     History of diabetes mellitus    Personal history of other endocrine, nutritional and metabolic disease     History of hyperlipidemia     Past Surgical History:   Procedure Laterality Date    ANKLE SURGERY  11/24/2014    Ankle  Surgery    BACK SURGERY  11/24/2014    Back Surgery    CHOLECYSTECTOMY  11/24/2014    Cholecystectomy    COLONOSCOPY  11/24/2014    Complete Colonoscopy    MANDIBLE SURGERY  11/24/2014    Jaw Surgery    OTHER SURGICAL HISTORY  11/24/2014    Treatment Of The Right Foot     Social History     Tobacco Use    Smoking status: Never    Smokeless tobacco: Never   Substance Use Topics    Alcohol use: Not Currently     family history includes Cirrhosis in her mother; Diabetes in her maternal grandmother.    Current Outpatient Medications:     atorvastatin (Lipitor) 20 mg tablet, Take 1 tablet (20 mg) by mouth once daily., Disp: , Rfl:     cholestyramine (Questran) 4 gram packet, Take 2 packets (8 g) by mouth once daily. Mix contents with 6 oz of noncarbonated beverage and swallow., Disp: , Rfl:     clobetasol (Temovate) 0.05 % cream, Apply sparingly to affected area twice daily, Disp: , Rfl:     LORazepam (Ativan) 0.5 mg tablet, Take 2 tablets (1 mg) by mouth 2 times a day as needed., Disp: , Rfl:     melatonin 5 mg tablet, Take 1 tablet (5 mg) by mouth once daily at bedtime., Disp: , Rfl:     metFORMIN (Glucophage) 500 mg tablet, Take 1 tablet (500 mg) by mouth 2 times daily (morning and late afternoon)., Disp: , Rfl:     metoprolol succinate XL (Toprol-XL) 50 mg 24 hr tablet, Take 1 tablet (50 mg) by mouth once daily., Disp: , Rfl:     multivitamin with minerals iron-free (Centrum Silver), Take 1 tablet by mouth once daily., Disp: , Rfl:     sertraline (Zoloft) 50 mg tablet, Take 1 tablet (50 mg) by mouth once daily., Disp: , Rfl:     triamterene-hydrochlorothiazid (Maxzide-25) 37.5-25 mg tablet, Take 1 tablet by mouth once daily. As directed., Disp: , Rfl:   No Known Allergies    Physical Examination:    General: NAD, AOx 3,  no aphasia or dysarthria, normal fund of knowledge  Cranial Nerves II-XII: VFF, PERRL, EOMI, Face Symm, Facial SILT, Palate/Tongue midline and symmetric  Motor: 5/5 Throughout all extremities,  No  drift, no dysmetria on finger to nose  Sensation: SILT and PP throughout all extremities  DTRS: 1+ Throughout, No Hoffmans or Clonus    Results:  I personally reviewed and interpreted the imaging results which included plain x-rays of her lumbar and cervical spine.  The lumbar spine shows the old fusion with the fractured screw but she has been able to maintain her alignment and posterior with the additional levels of decompression.  In the cervical spine she has significant spondylosis at C4-5, C5-6, and C6/7 and she has very obvious arthritis in the facets at C3-4 with subluxation of C3 on C4.  She gets about a millimeter to 2 mm of subluxation.  She does not quite have hypermobility but that is what organ to be on the look out for    Assessment and Plan:      Angleina Becerril is a 74 y.o. year old female who presents to the spine clinic in follow up with good improvement of her arm symptoms following her carpal tunnel decompression.  We are concerned about C3-4 becoming a hypermobile segment of her spine.  I gave her a list of things to look out for and if they develop she is to contact us and we will order an MRI and bring her back in for discussion.  But at the very least I like to bring her back in next year and get another flex ex view of her cervical spine.  She can see me after those flex ex views.      I have reviewed all prior documentation and reviewed the electronic medical record since admission. I have personally have reviewed all advanced imaging not just the reports and used my interpretation as documented as the relevant findings. I have reviewed the risks and benefits of all treatment recommendations listed in this note with the patient and family. I spent a total of 30 minutes in service to this patient's care during this date of service.      The above clinical summary has been dictated with voice recognition software. It has not been proofread for grammatical errors, typographical mistakes, or other  semantic inconsistencies.    Thank you for visiting our office today. It was our pleasure to take part in your healthcare.     Do not hesitate to call with any questions regarding your plan of care after leaving. My office can be reached at [phone] M-F 8am-4pm.     To clinicians, thank you very much for this kind referral. It is a privilege to partner with you in the care of your patients. My office would be delighted to assist you with any further consultations or with questions regarding the plan of care outlined. Do not hesitate to call the office or contact me directly.     Sincerely,    Terry Moran MD, FAANS, FACS  Board Certified Neurological Surgeon  , Department of Neurological Surgery  [facility] [employer]    [facility]  [address]  Medical [facility] [address]  [place] [address]    [facility]  [address]  [address]  [place] [address]    Phone: [phone]  Fax: [phone]

## 2024-07-03 ENCOUNTER — HOSPITAL ENCOUNTER (OUTPATIENT)
Dept: RADIOLOGY | Facility: CLINIC | Age: 74
Discharge: HOME | End: 2024-07-03
Payer: MEDICARE

## 2024-07-03 VITALS — WEIGHT: 172 LBS | BODY MASS INDEX: 27.64 KG/M2 | HEIGHT: 66 IN

## 2024-07-03 DIAGNOSIS — Z12.31 ENCOUNTER FOR SCREENING MAMMOGRAM FOR MALIGNANT NEOPLASM OF BREAST: ICD-10-CM

## 2024-07-03 PROCEDURE — 77067 SCR MAMMO BI INCL CAD: CPT

## 2024-08-26 ENCOUNTER — APPOINTMENT (OUTPATIENT)
Dept: PODIATRY | Facility: CLINIC | Age: 74
End: 2024-08-26
Payer: MEDICARE

## 2024-08-26 DIAGNOSIS — L85.3 XEROSIS CUTIS: ICD-10-CM

## 2024-08-26 DIAGNOSIS — M79.672 FOOT PAIN, BILATERAL: ICD-10-CM

## 2024-08-26 DIAGNOSIS — B35.1 ONYCHOMYCOSIS: ICD-10-CM

## 2024-08-26 DIAGNOSIS — E11.9 CONTROLLED TYPE 2 DIABETES MELLITUS WITHOUT COMPLICATION, WITHOUT LONG-TERM CURRENT USE OF INSULIN (MULTI): ICD-10-CM

## 2024-08-26 DIAGNOSIS — B35.3 TINEA PEDIS OF BOTH FEET: Primary | ICD-10-CM

## 2024-08-26 DIAGNOSIS — M79.671 FOOT PAIN, BILATERAL: ICD-10-CM

## 2024-08-26 PROCEDURE — 99214 OFFICE O/P EST MOD 30 MIN: CPT | Performed by: PODIATRIST

## 2024-08-26 PROCEDURE — 1160F RVW MEDS BY RX/DR IN RCRD: CPT | Performed by: PODIATRIST

## 2024-08-26 PROCEDURE — 1159F MED LIST DOCD IN RCRD: CPT | Performed by: PODIATRIST

## 2024-08-26 PROCEDURE — 1036F TOBACCO NON-USER: CPT | Performed by: PODIATRIST

## 2024-08-26 RX ORDER — CALCIPOTRIENE 50 UG/G
OINTMENT TOPICAL
COMMUNITY
Start: 2024-01-10

## 2024-08-26 RX ORDER — AMMONIUM LACTATE 12 G/100G
CREAM TOPICAL
Qty: 385 G | Refills: 2 | Status: SHIPPED | OUTPATIENT
Start: 2024-08-26

## 2024-08-26 RX ORDER — CLOTRIMAZOLE AND BETAMETHASONE DIPROPIONATE 10; .64 MG/G; MG/G
1 CREAM TOPICAL 2 TIMES DAILY
Qty: 45 G | Refills: 2 | Status: SHIPPED | OUTPATIENT
Start: 2024-08-26 | End: 2024-09-23

## 2024-08-26 ASSESSMENT — ENCOUNTER SYMPTOMS
EYES NEGATIVE: 1
PSYCHIATRIC NEGATIVE: 1
CONSTITUTIONAL NEGATIVE: 1
HEMATOLOGIC/LYMPHATIC NEGATIVE: 1
NUMBNESS: 1
ENDOCRINE NEGATIVE: 1
GASTROINTESTINAL NEGATIVE: 1
ALLERGIC/IMMUNOLOGIC NEGATIVE: 1
CARDIOVASCULAR NEGATIVE: 1

## 2024-08-26 NOTE — PROGRESS NOTES
Chief Complaint   Patient presents with    DM Foot Care     Patient is here today diabetic foot care. Last seen 11/10/2022      Last seen 11/10/2022  Chief complaint is dry cracked skin of both feet.  Thinks she has a fungus.  Lots of scaly skin.  Has some bleeding.  Fissures.  Duration about a year.  Sometimes admits to itching with the feet.  At times numbness with the feet mostly at night.  History of onychomycosis.  Nails are still thickened.  Did take Lamisil quite some time ago without any real success.  History of back surgeries. Patient takes metformin.  Last A1c 6.1.    Review of Systems   Constitutional: Negative.    HENT: Negative.     Eyes: Negative.    Cardiovascular: Negative.    Gastrointestinal: Negative.    Endocrine: Negative.    Genitourinary: Negative.    Skin:         Skin changes  Itching   Allergic/Immunologic: Negative.    Neurological:  Positive for numbness.   Hematological: Negative.    Psychiatric/Behavioral: Negative.           General/Constitutional: Alert. NAD.   Respiratory: Non labored breathing.   Psychiatric: Mood and affect normal/baseline.   HEENT: Sclera clear.   Dermatologic: Multiple nails are hypertrophic crumbly and yellow. No acute inflammatory infectious process. Web spaces are dry. No ulcers no pre-ulcerative areas.  Fissure is noted.  Scaly skin is noted.  Plantar aspect both heels with scaly skin.  Vascular: Pedal pulses are intact and symmetric including the dorsalis pedis and the posterior tibial pulses. Feet are warm to touch. No swelling appreciated either extremity.  Neurological: Alert and oriented. No acute distress. No sensory impairment bilateral.  Monofilament testing normal.  Musculoskeletal: Strength is normal for age. No acute deficits appreciated.    Impression: Multiple skin changes including xerosis cutis tinea pedis and onychomycosis.  Diabetes    -Follow-up 4 weeks.    -Today's treatment and course of therapy was discussed with the patient in detail.  Patient's questions were answered. Proper foot care was discussed. This dictation was done using Dragon computer software and as such may contain grammatical errors.    -Recommend wearing shoes and socks instead of just sandals.    -Second toe right foot small Band-Aid some Neosporin at the fissure site do this daily.    -Will prescribe Lotrisone for the scaly skin on the bottom of the left heel and right heel.    -Will prescribe ammonium lactate to both feet daily rub this in well.  Lotrisone can be used on top of that.    -Avoid picking and scratching at your feet.

## 2024-09-30 ENCOUNTER — OFFICE VISIT (OUTPATIENT)
Dept: PODIATRY | Facility: CLINIC | Age: 74
End: 2024-09-30
Payer: MEDICARE

## 2024-09-30 DIAGNOSIS — E11.9 CONTROLLED TYPE 2 DIABETES MELLITUS WITHOUT COMPLICATION, WITHOUT LONG-TERM CURRENT USE OF INSULIN (MULTI): ICD-10-CM

## 2024-09-30 DIAGNOSIS — M79.672 FOOT PAIN, BILATERAL: ICD-10-CM

## 2024-09-30 DIAGNOSIS — B35.3 TINEA PEDIS OF BOTH FEET: Primary | ICD-10-CM

## 2024-09-30 DIAGNOSIS — M79.671 FOOT PAIN, BILATERAL: ICD-10-CM

## 2024-09-30 DIAGNOSIS — L85.3 XEROSIS CUTIS: ICD-10-CM

## 2024-09-30 DIAGNOSIS — B35.1 ONYCHOMYCOSIS: ICD-10-CM

## 2024-09-30 PROCEDURE — 1159F MED LIST DOCD IN RCRD: CPT | Performed by: PODIATRIST

## 2024-09-30 PROCEDURE — 1036F TOBACCO NON-USER: CPT | Performed by: PODIATRIST

## 2024-09-30 PROCEDURE — 99213 OFFICE O/P EST LOW 20 MIN: CPT | Performed by: PODIATRIST

## 2024-09-30 PROCEDURE — 1160F RVW MEDS BY RX/DR IN RCRD: CPT | Performed by: PODIATRIST

## 2024-09-30 ASSESSMENT — ENCOUNTER SYMPTOMS
CONSTITUTIONAL NEGATIVE: 1
PSYCHIATRIC NEGATIVE: 1
ALLERGIC/IMMUNOLOGIC NEGATIVE: 1
EYES NEGATIVE: 1
CARDIOVASCULAR NEGATIVE: 1
NUMBNESS: 1
GASTROINTESTINAL NEGATIVE: 1
HEMATOLOGIC/LYMPHATIC NEGATIVE: 1
ENDOCRINE NEGATIVE: 1

## 2024-09-30 NOTE — PROGRESS NOTES
Chief Complaint   Patient presents with    Tinea Pedis     Patient is here today for a follow up on tinea pedis MY. Patient is much improved         Last seen 11/10/2022.      Follow-up bilateral foot dry skin.  Overall improvement appreciated.  Fissuring has improved.  Dry skin has improved.  Using products as prescribed.  Itching improved.  No other complaints today.  No changes past medical history.  History of back surgeries. Patient takes metformin.  Last A1c 6.1.    Review of Systems   Constitutional: Negative.    HENT: Negative.     Eyes: Negative.    Cardiovascular: Negative.    Gastrointestinal: Negative.    Endocrine: Negative.    Genitourinary: Negative.    Skin:         Skin changes  Itching   Allergic/Immunologic: Negative.    Neurological:  Positive for numbness.   Hematological: Negative.    Psychiatric/Behavioral: Negative.           General/Constitutional: Alert. NAD.   Respiratory: Non labored breathing.   Psychiatric: Mood and affect normal/baseline.   HEENT: Sclera clear.   Dermatologic: Significant improvement appreciated with the fissured areas on the feet.  Calluses also greatly improved.  Xerosis cutis improved.  No signs of infection or any acute inflammation noted.  Web spaces are dry. No ulcers no pre-ulcerative areas.  Fissure is noted.  Plantar scaling greatly improved.    Vascular: Pedal pulses are intact and symmetric including the dorsalis pedis and the posterior tibial pulses. Feet are warm to touch. No swelling appreciated either extremity.  Neurological: Alert and oriented. No acute distress. No sensory impairment bilateral.  Monofilament testing normal.  Musculoskeletal: Strength is normal for age. No acute deficits appreciated.    Impression: Improvement with multiple skin changes including xerosis cutis tinea pedis and onychomycosis.  Diabetes    -Today's treatment and course of therapy was discussed with the patient in detail. Patient's questions were answered. Proper foot  care was discussed. This dictation was done using Dragon computer software and as such may contain grammatical errors.    -Recommend continuing wearing shoes and socks instead of just sandals.    -Okay to continue with the Lotrisone for a few more weeks.  As this improves you can discontinue its use.  Potential for recurrence discussed    -Encourage regular use of ammonium lactate or Eucerin or similar lotion on the feet.  Okay to use an emery board or buffer to smooth down any dry skin.    -Okay to try tolnaftate 1% liquid on the nails.

## 2024-11-04 DIAGNOSIS — M47.812 CERVICAL SPONDYLOSIS WITHOUT MYELOPATHY: Primary | ICD-10-CM

## 2025-02-18 ENCOUNTER — HOSPITAL ENCOUNTER (OUTPATIENT)
Dept: RADIOLOGY | Facility: CLINIC | Age: 75
Discharge: HOME | End: 2025-02-18
Payer: MEDICARE

## 2025-02-18 DIAGNOSIS — M47.812 CERVICAL SPONDYLOSIS WITHOUT MYELOPATHY: ICD-10-CM

## 2025-02-18 PROCEDURE — 72050 X-RAY EXAM NECK SPINE 4/5VWS: CPT

## 2025-02-18 PROCEDURE — 72050 X-RAY EXAM NECK SPINE 4/5VWS: CPT | Performed by: RADIOLOGY

## 2025-02-26 ENCOUNTER — HOSPITAL ENCOUNTER (OUTPATIENT)
Dept: RADIOLOGY | Facility: CLINIC | Age: 75
Discharge: HOME | End: 2025-02-26
Payer: MEDICARE

## 2025-02-26 DIAGNOSIS — Z13.6 SCREENING FOR HEART DISEASE: ICD-10-CM

## 2025-02-26 DIAGNOSIS — Z87.891 HISTORY OF SMOKING: ICD-10-CM

## 2025-02-26 PROCEDURE — 71271 CT THORAX LUNG CANCER SCR C-: CPT

## 2025-02-26 PROCEDURE — 71271 CT THORAX LUNG CANCER SCR C-: CPT | Performed by: STUDENT IN AN ORGANIZED HEALTH CARE EDUCATION/TRAINING PROGRAM

## 2025-02-26 PROCEDURE — 75571 CT HRT W/O DYE W/CA TEST: CPT

## 2025-03-05 ENCOUNTER — APPOINTMENT (OUTPATIENT)
Dept: NEUROSURGERY | Facility: CLINIC | Age: 75
End: 2025-03-05
Payer: MEDICARE

## 2025-03-05 VITALS
BODY MASS INDEX: 28.12 KG/M2 | WEIGHT: 175 LBS | HEART RATE: 73 BPM | TEMPERATURE: 97.8 F | DIASTOLIC BLOOD PRESSURE: 78 MMHG | SYSTOLIC BLOOD PRESSURE: 130 MMHG | HEIGHT: 66 IN

## 2025-03-05 DIAGNOSIS — M47.12 CERVICAL SPONDYLOSIS WITH MYELOPATHY: Primary | ICD-10-CM

## 2025-03-05 PROCEDURE — 1126F AMNT PAIN NOTED NONE PRSNT: CPT | Performed by: NEUROLOGICAL SURGERY

## 2025-03-05 PROCEDURE — 1036F TOBACCO NON-USER: CPT | Performed by: NEUROLOGICAL SURGERY

## 2025-03-05 PROCEDURE — 1159F MED LIST DOCD IN RCRD: CPT | Performed by: NEUROLOGICAL SURGERY

## 2025-03-05 PROCEDURE — 99214 OFFICE O/P EST MOD 30 MIN: CPT | Performed by: NEUROLOGICAL SURGERY

## 2025-03-05 RX ORDER — EMPAGLIFLOZIN 25 MG/1
25 TABLET, FILM COATED ORAL DAILY
COMMUNITY
Start: 2025-01-12

## 2025-03-05 ASSESSMENT — PATIENT HEALTH QUESTIONNAIRE - PHQ9
SUM OF ALL RESPONSES TO PHQ9 QUESTIONS 1 AND 2: 0
1. LITTLE INTEREST OR PLEASURE IN DOING THINGS: NOT AT ALL
SUM OF ALL RESPONSES TO PHQ9 QUESTIONS 1 & 2: 0
2. FEELING DOWN, DEPRESSED OR HOPELESS: NOT AT ALL
1. LITTLE INTEREST OR PLEASURE IN DOING THINGS: NOT AT ALL
2. FEELING DOWN, DEPRESSED OR HOPELESS: NOT AT ALL

## 2025-03-05 ASSESSMENT — PAIN SCALES - GENERAL: PAINLEVEL_OUTOF10: 0-NO PAIN

## 2025-03-05 ASSESSMENT — ENCOUNTER SYMPTOMS: OCCASIONAL FEELINGS OF UNSTEADINESS: 0

## 2025-03-05 NOTE — PROGRESS NOTES
[facility] [place]  Department of Neurological Surgery  Established Patient Visit    History of Present Illness  Angelina Becerril is a 74 y.o. year old female who presents to the spine clinic in follow up with new plain x-rays of her cervical spine.  I have been following this patient for her lumbar spine and her cervical spine over the years.  For the last few years we have been following her cervical spondylosis with subluxation at C3-4.  2 years back she had a significant flareup and I was concerned about her cervical spine but it stabilized and her symptoms eased up.  We are going to continue to follow her expectantly.  She is using her hands well.  She is able to use buttons and snaps and zippers without difficulty.  She says her balance has not changed.  She is cautious.  She wants to continue to follow her expectantly.  My suggestion is that if she has any concerns that she should contact our office and our nurse practitioner Samantha Meeson CNP could see her and if need be they can get a new MRI and make a referral to Dr. Bruce.  But in the meantime I told her she is doing so well we will just have her continue to follow with her primary team.    Patient's BMI is Body mass index is 28.25 kg/m².    14/14 systems reviewed and negative other than what is listed in the history of present illness    Patient Active Problem List   Diagnosis    Irritable bowel syndrome with diarrhea    Onychocryptosis    Onychomycosis    Paronychia of great toe of left foot    Pressure injury of toe of right foot, stage 2 (Multi)    Carpal tunnel syndrome    Cervical myelopathy    Cervical spondylosis with myelopathy    Degeneration of intervertebral disc of cervical region with osteophyte of cervical vertebra    Status post lumbar spinal fusion    Imbalance    Spinal stenosis, lumbar region with neurogenic claudication    Numbness of anterior thigh    Spondylolisthesis, grade 1    OA (osteoarthritis) of knee     Past  Medical History:   Diagnosis Date    Anxiety disorder, unspecified     Anxiety    Personal history of diseases of the skin and subcutaneous tissue     History of psoriasis    Personal history of other diseases of the circulatory system     History of hypertension    Personal history of other endocrine, nutritional and metabolic disease     History of diabetes mellitus    Personal history of other endocrine, nutritional and metabolic disease     History of hyperlipidemia     Past Surgical History:   Procedure Laterality Date    ANKLE SURGERY  11/24/2014    Ankle Surgery    BACK SURGERY  11/24/2014    Back Surgery    CHOLECYSTECTOMY  11/24/2014    Cholecystectomy    COLONOSCOPY  11/24/2014    Complete Colonoscopy    MANDIBLE SURGERY  11/24/2014    Jaw Surgery    OTHER SURGICAL HISTORY  11/24/2014    Treatment Of The Right Foot     Social History     Tobacco Use    Smoking status: Never    Smokeless tobacco: Never   Substance Use Topics    Alcohol use: Yes     Comment: Socially     family history includes Breast cancer (age of onset: 53) in her daughter; Cirrhosis in her mother; Diabetes in her maternal grandmother.    Current Outpatient Medications:     ammonium lactate (Amlactin) 12 % cream, Apply thin layer daily both feet.  Rubbing well., Disp: 385 g, Rfl: 2    atorvastatin (Lipitor) 20 mg tablet, Take 1 tablet (20 mg) by mouth once daily., Disp: , Rfl:     calcipotriene (Dovonex) 0.005 % ointment, , Disp: , Rfl:     cholestyramine (Questran) 4 gram packet, Take 2 packets (8 g) by mouth once daily. Mix contents with 6 oz of noncarbonated beverage and swallow., Disp: , Rfl:     clobetasol (Temovate) 0.05 % cream, Apply sparingly to affected area twice daily, Disp: , Rfl:     Jardiance 25 mg, Take 1 tablet (25 mg) by mouth once daily., Disp: , Rfl:     LORazepam (Ativan) 0.5 mg tablet, Take 2 tablets (1 mg) by mouth 2 times a day as needed., Disp: , Rfl:     melatonin 5 mg tablet, Take 1 tablet (5 mg) by mouth once  daily at bedtime., Disp: , Rfl:     metFORMIN (Glucophage) 500 mg tablet, Take 1 tablet (500 mg) by mouth 2 times daily (morning and late afternoon)., Disp: , Rfl:     metoprolol succinate XL (Toprol-XL) 50 mg 24 hr tablet, Take 1 tablet (50 mg) by mouth once daily., Disp: , Rfl:     multivitamin with minerals iron-free (Centrum Silver), Take 1 tablet by mouth once daily., Disp: , Rfl:     sertraline (Zoloft) 50 mg tablet, Take 1 tablet (50 mg) by mouth once daily., Disp: , Rfl:     triamterene-hydrochlorothiazid (Maxzide-25) 37.5-25 mg tablet, Take 1 tablet by mouth once daily. As directed., Disp: , Rfl:   No Known Allergies    Physical Examination:      General: NAD, AOx 3,  no aphasia or dysarthria, normal fund of knowledge  Cranial Nerves II-XII: VFF, PERRL, EOMI, Face Symm, Facial SILT, Palate/Tongue midline and symmetric  Motor: 5/5 Throughout all extremities,  No drift, no dysmetria on finger to nose  Sensation: SILT and PP throughout all extremities  DTRS: 2+ Throughout, No Hoffmans or Clonus  Gait is nonantalgic and she has good balance    Results:  I personally reviewed and interpreted the imaging results which included plain x-rays that were compared against the 2024, 2023, and 2017 films.  The subluxation developed at C3-4 between the 17 and the 23 films.  There has been no increase in subluxation since.    Assessment and Plan:      Angelina Becerril is a 74 y.o. year old female who presents to the spine clinic in follow up with new plain x-rays of her cervical spine.  I have been following this patient for her lumbar spine and her cervical spine over the years.  For the last few years we have been following her cervical spondylosis with subluxation at C3-4.  2 years back she had a significant flareup and I was concerned about her cervical spine but it stabilized and her symptoms eased up.  We are going to continue to follow her expectantly.  She is using her hands well.  She is able to use buttons and snaps  and zippers without difficulty.  She says her balance has not changed.  She is cautious.  She wants to continue to follow her expectantly.  My suggestion is that if she has any concerns that she should contact our office and our nurse practitioner Samantha Meeson CNP could see her and if need be they can get a new MRI and make a referral to Dr. Bruce.  But in the meantime I told her she is doing so well we will just have her continue to follow with her primary team.      I have reviewed all prior documentation and reviewed the electronic medical record since admission. I have personally have reviewed all advanced imaging not just the reports and used my interpretation as documented as the relevant findings. I have reviewed the risks and benefits of all treatment recommendations listed in this note with the patient and family.       The above clinical summary has been dictated with voice recognition software. It has not been proofread for grammatical errors, typographical mistakes, or other semantic inconsistencies.    Thank you for visiting our office today. It was our pleasure to take part in your healthcare.     Do not hesitate to call with any questions regarding your plan of care after leaving. My office can be reached at [phone] M-B 8am-4pm.     To clinicians, thank you very much for this kind referral. It is a privilege to partner with you in the care of your patients. My office would be delighted to assist you with any further consultations or with questions regarding the plan of care outlined. Do not hesitate to call the office or contact me directly.     Sincerely,    Terry Moran MD, FAANS, FACS  Board Certified Neurological Surgeon  , Department of Neurological Surgery  [facility]    [facility]  [address] [place] [address]., [address]  [facility] [address]  [address]  [facility]  [address] [place] [id number]    Phone: [phone]  Fax: [phone]

## 2025-05-20 ENCOUNTER — APPOINTMENT (OUTPATIENT)
Dept: RADIOLOGY | Facility: CLINIC | Age: 75
End: 2025-05-20
Payer: MEDICARE

## 2025-06-10 ENCOUNTER — HOSPITAL ENCOUNTER (OUTPATIENT)
Dept: CARDIOLOGY | Facility: CLINIC | Age: 75
Discharge: HOME | End: 2025-06-10
Payer: MEDICARE

## 2025-06-10 DIAGNOSIS — R60.0 LOWER EXTREMITY EDEMA: ICD-10-CM

## 2025-06-10 PROCEDURE — 93306 TTE W/DOPPLER COMPLETE: CPT | Performed by: STUDENT IN AN ORGANIZED HEALTH CARE EDUCATION/TRAINING PROGRAM

## 2025-06-10 PROCEDURE — 93306 TTE W/DOPPLER COMPLETE: CPT

## 2025-06-12 LAB
AORTIC VALVE PEAK VELOCITY: 1.38 M/S
AV PEAK GRADIENT: 8 MMHG
AVA (PEAK VEL): 2.32 CM2
EJECTION FRACTION APICAL 4 CHAMBER: 63
EJECTION FRACTION: 61 %
LEFT ATRIUM VOLUME AREA LENGTH INDEX BSA: 40.7 ML/M2
LEFT VENTRICLE INTERNAL DIMENSION DIASTOLE: 5.77 CM (ref 3.5–6)
LEFT VENTRICULAR OUTFLOW TRACT DIAMETER: 2 CM
MITRAL VALVE E/A RATIO: 0.75
RIGHT VENTRICLE FREE WALL PEAK S': 6.17 CM/S
RIGHT VENTRICLE PEAK SYSTOLIC PRESSURE: 29 MMHG
TRICUSPID ANNULAR PLANE SYSTOLIC EXCURSION: 1.8 CM

## 2025-07-01 ENCOUNTER — APPOINTMENT (OUTPATIENT)
Dept: NEUROSURGERY | Facility: CLINIC | Age: 75
End: 2025-07-01
Payer: MEDICARE

## 2025-07-07 ENCOUNTER — HOSPITAL ENCOUNTER (OUTPATIENT)
Dept: RADIOLOGY | Facility: CLINIC | Age: 75
Discharge: HOME | End: 2025-07-07
Payer: MEDICARE

## 2025-07-07 VITALS — BODY MASS INDEX: 28.12 KG/M2 | WEIGHT: 175 LBS | HEIGHT: 66 IN

## 2025-07-07 DIAGNOSIS — Z12.31 SCREENING MAMMOGRAM FOR BREAST CANCER: ICD-10-CM

## 2025-07-07 PROCEDURE — 77067 SCR MAMMO BI INCL CAD: CPT

## 2025-07-07 PROCEDURE — 77063 BREAST TOMOSYNTHESIS BI: CPT | Performed by: RADIOLOGY

## 2025-07-07 PROCEDURE — 77067 SCR MAMMO BI INCL CAD: CPT | Performed by: RADIOLOGY

## 2025-07-17 ENCOUNTER — APPOINTMENT (OUTPATIENT)
Dept: RADIOLOGY | Facility: CLINIC | Age: 75
End: 2025-07-17
Payer: MEDICARE

## (undated) DEVICE — Device

## (undated) DEVICE — ADHESIVE, SKIN, LIQUIBAND EXCEED

## (undated) DEVICE — BANDAGE, ELASTIC, PREMIUM, SELF-CLOSE, 2 IN X 5 YD, STERILE

## (undated) DEVICE — DRAPE, TIBURON, SPLIT SHEET, REINF ADH STRIP, 77X108

## (undated) DEVICE — NEEDLE, ARTERIAL/VENOUS, BLUNT FILL, 18 G X 1.5 IN

## (undated) DEVICE — OINTMENT, BACITRACIN, W/ZINC, 1 OZ

## (undated) DEVICE — STOCKINETTE, DOUBLE PLY, 4 X 48 IN, STERILE

## (undated) DEVICE — DRAPE, SHEET, U, W/ADHESIVE STRIP, IMPERVIOUS, 60 X 70 IN, DISPOSABLE, LF, STERILE

## (undated) DEVICE — NEEDLE, HYPODERMIC, MONOJECT, 27 G X 1.5 IN

## (undated) DEVICE — FORCEP, BIOPOLAR, ADSON, NON INSUL, 5IN 1.0MM

## (undated) DEVICE — PADDING, UNDERCAST, WYTEX, 2 IN X 4 YD, STERILE